# Patient Record
Sex: MALE | Race: WHITE | NOT HISPANIC OR LATINO | Employment: OTHER | URBAN - METROPOLITAN AREA
[De-identification: names, ages, dates, MRNs, and addresses within clinical notes are randomized per-mention and may not be internally consistent; named-entity substitution may affect disease eponyms.]

---

## 2017-05-15 ENCOUNTER — CLINICAL SUPPORT (OUTPATIENT)
Dept: SMOKING CESSATION | Facility: CLINIC | Age: 62
End: 2017-05-15
Payer: COMMERCIAL

## 2017-05-15 DIAGNOSIS — F17.200 TOBACCO USE DISORDER: Primary | ICD-10-CM

## 2017-05-15 PROCEDURE — 99404 PREV MED CNSL INDIV APPRX 60: CPT | Mod: S$GLB,,,

## 2017-05-15 PROCEDURE — 99999 PR PBB SHADOW E&M-EST. PATIENT-LVL I: CPT | Mod: PBBFAC,,,

## 2017-05-15 RX ORDER — IBUPROFEN 200 MG
1 TABLET ORAL DAILY
Qty: 28 PATCH | Refills: 0 | COMMUNITY
Start: 2017-05-15 | End: 2017-05-15 | Stop reason: CLARIF

## 2017-05-15 RX ORDER — GABAPENTIN 250 MG/5ML
SOLUTION ORAL 3 TIMES DAILY
COMMUNITY
End: 2024-02-27

## 2017-05-15 RX ORDER — IBUPROFEN 200 MG
1 TABLET ORAL DAILY
Qty: 14 PATCH | Refills: 0 | COMMUNITY
Start: 2017-05-15 | End: 2017-05-16 | Stop reason: SDUPTHER

## 2017-05-16 ENCOUNTER — CLINICAL SUPPORT (OUTPATIENT)
Dept: SMOKING CESSATION | Facility: CLINIC | Age: 62
End: 2017-05-16
Payer: COMMERCIAL

## 2017-05-16 DIAGNOSIS — F17.200 TOBACCO USE DISORDER: Primary | ICD-10-CM

## 2017-05-16 PROCEDURE — 99407 BEHAV CHNG SMOKING > 10 MIN: CPT | Mod: S$GLB,,,

## 2017-05-16 RX ORDER — IBUPROFEN 200 MG
1 TABLET ORAL DAILY
Qty: 14 PATCH | Refills: 0 | Status: SHIPPED | OUTPATIENT
Start: 2017-05-16 | End: 2017-05-29 | Stop reason: SDUPTHER

## 2017-05-23 ENCOUNTER — CLINICAL SUPPORT (OUTPATIENT)
Dept: SMOKING CESSATION | Facility: CLINIC | Age: 62
End: 2017-05-23
Payer: COMMERCIAL

## 2017-05-23 DIAGNOSIS — F17.200 TOBACCO USE DISORDER: Primary | ICD-10-CM

## 2017-05-23 PROCEDURE — 99407 BEHAV CHNG SMOKING > 10 MIN: CPT | Mod: S$GLB,,,

## 2017-05-29 ENCOUNTER — CLINICAL SUPPORT (OUTPATIENT)
Dept: SMOKING CESSATION | Facility: CLINIC | Age: 62
End: 2017-05-29
Payer: COMMERCIAL

## 2017-05-29 DIAGNOSIS — F17.200 TOBACCO USE DISORDER: ICD-10-CM

## 2017-05-29 PROCEDURE — 99403 PREV MED CNSL INDIV APPRX 45: CPT | Mod: S$GLB,,,

## 2017-05-29 PROCEDURE — 99999 PR PBB SHADOW E&M-EST. PATIENT-LVL I: CPT | Mod: PBBFAC,,,

## 2017-05-29 RX ORDER — IBUPROFEN 200 MG
1 TABLET ORAL DAILY
Qty: 14 PATCH | Refills: 0 | Status: SHIPPED | OUTPATIENT
Start: 2017-05-29 | End: 2017-06-12 | Stop reason: DRUGHIGH

## 2017-05-29 NOTE — PROGRESS NOTES
Individual Follow-Up Form    5/29/2017    Quit Date: 5/165/17    Clinical Status of Patient: Outpatient    Length of Service: 45 minutes    Continuing Medication: yes  Patches    Other Medications:      Target Symptoms: Withdrawal and medication side effects. The following were  rated moderate (3) to severe (4) on TCRS:  · Moderate (3): none  · Severe (4): none    Comments: Pt seen in office today. Patient is tobacco free since 5/16/17 . Pt remains on tobacco cessation medication of 21 mg nicotine patch QD  No adverse effects noted at this time. Reviewed coping strategies/habitual behavior/relapse prevention with patient. Exhaled carbon monoxide level was 0 ppm per Smokerlyzer (non- smoker = 0-5 ppm.) Will see pt back in office in 2 weeks.        Diagnosis: F17.200    Next Visit: 2 weeks

## 2017-05-29 NOTE — Clinical Note
Pt seen in office today. Patient is tobacco free since 5/16/17 . Pt remains on tobacco cessation medication of 21 mg nicotine patch QD  No adverse effects noted at this time. Reviewed coping strategies/habitual behavior/relapse prevention with patient. Exhaled carbon monoxide level was 0 ppm per Smokerlyzer (non- smoker = 0-5 ppm.) Will see pt back in office in 2 weeks. Gave patient his quit coin.

## 2017-06-06 ENCOUNTER — CLINICAL SUPPORT (OUTPATIENT)
Dept: SMOKING CESSATION | Facility: CLINIC | Age: 62
End: 2017-06-06
Payer: COMMERCIAL

## 2017-06-06 DIAGNOSIS — F17.200 TOBACCO USE DISORDER: Primary | ICD-10-CM

## 2017-06-06 PROCEDURE — 99407 BEHAV CHNG SMOKING > 10 MIN: CPT | Mod: S$GLB,,,

## 2017-06-12 ENCOUNTER — CLINICAL SUPPORT (OUTPATIENT)
Dept: SMOKING CESSATION | Facility: CLINIC | Age: 62
End: 2017-06-12
Payer: COMMERCIAL

## 2017-06-12 DIAGNOSIS — F17.200 TOBACCO USE DISORDER: Primary | ICD-10-CM

## 2017-06-12 PROCEDURE — 99407 BEHAV CHNG SMOKING > 10 MIN: CPT | Mod: S$GLB,,,

## 2017-06-12 RX ORDER — IBUPROFEN 200 MG
1 TABLET ORAL DAILY
Qty: 14 PATCH | Refills: 0 | Status: SHIPPED | OUTPATIENT
Start: 2017-06-12 | End: 2017-06-26 | Stop reason: SDUPTHER

## 2017-06-26 ENCOUNTER — CLINICAL SUPPORT (OUTPATIENT)
Dept: SMOKING CESSATION | Facility: CLINIC | Age: 62
End: 2017-06-26
Payer: COMMERCIAL

## 2017-06-26 DIAGNOSIS — F17.200 TOBACCO USE DISORDER: Primary | ICD-10-CM

## 2017-06-26 PROCEDURE — 99999 PR PBB SHADOW E&M-EST. PATIENT-LVL I: CPT | Mod: PBBFAC,,,

## 2017-06-26 PROCEDURE — 99403 PREV MED CNSL INDIV APPRX 45: CPT | Mod: S$GLB,,,

## 2017-06-26 RX ORDER — IBUPROFEN 200 MG
1 TABLET ORAL DAILY
Qty: 14 PATCH | Refills: 0 | Status: SHIPPED | OUTPATIENT
Start: 2017-06-26 | End: 2017-07-10 | Stop reason: SDUPTHER

## 2017-06-26 NOTE — PROGRESS NOTES
Individual Follow-Up Form    6/26/2017    Quit Date: 5/16/17    Clinical Status of Patient: Outpatient    Length of Service: 45 minutes    Continuing Medication: yes  Patches    Other Medications:      Target Symptoms: Withdrawal and medication side effects. The following were  rated moderate (3) to severe (4) on TCRS:  · Moderate (3): none  · Severe (4): none    Comments: Pt seen in office today. Patient is tobacco free since 5/16/17. Pt remains on tobacco cessation medication of  14 mg nicotine patch QD  No adverse effects/mental changes noted at this time. Reviewed coping strategies/habitual behavior/relapse prevention with patient. Exhaled carbon monoxide level was 0 ppm per Smokerlyzer (non- smoker = 0-5 ppm.) Will see pt back in office in 2 weeks.        Diagnosis: F17.200    Next Visit: 2 weeks

## 2017-07-10 ENCOUNTER — CLINICAL SUPPORT (OUTPATIENT)
Dept: SMOKING CESSATION | Facility: CLINIC | Age: 62
End: 2017-07-10
Payer: COMMERCIAL

## 2017-07-10 DIAGNOSIS — F17.200 TOBACCO USE DISORDER: Primary | ICD-10-CM

## 2017-07-10 PROCEDURE — 99402 PREV MED CNSL INDIV APPRX 30: CPT | Mod: S$GLB,,,

## 2017-07-10 RX ORDER — IBUPROFEN 200 MG
1 TABLET ORAL DAILY
Qty: 14 PATCH | Refills: 0 | Status: SHIPPED | OUTPATIENT
Start: 2017-07-10 | End: 2017-07-24 | Stop reason: DRUGHIGH

## 2017-07-10 NOTE — PROGRESS NOTES
Individual Follow-Up Form    7/10/2017    Quit Date: 5/16/17    Clinical Status of Patient: Outpatient    Length of Service: 30 minutes    Continuing Medication: yes  Patches    Other Medications: none     Target Symptoms: Withdrawal and medication side effects. The following were  rated moderate (3) to severe (4) on TCRS:  · Moderate (3): none  · Severe (4): none    Comments:  Patient is tobacco free. Pt remains on tobacco cessation medication of  14 mg nicotine patch QD and  No adverse effects  noted at this time. Reviewed coping strategies/habitual behavior/relapse prevention with patient. Exhaled carbon monoxide level was 0 ppm per Smokerlyzer (non- smoker = 0-5 ppm.) Will see pt back in office in 2 weeks.        Diagnosis: F17.200    Next Visit: 2 weeks

## 2017-07-24 ENCOUNTER — CLINICAL SUPPORT (OUTPATIENT)
Dept: SMOKING CESSATION | Facility: CLINIC | Age: 62
End: 2017-07-24
Payer: COMMERCIAL

## 2017-07-24 DIAGNOSIS — F17.200 TOBACCO USE DISORDER: Primary | ICD-10-CM

## 2017-07-24 PROCEDURE — 99403 PREV MED CNSL INDIV APPRX 45: CPT | Mod: S$GLB,,,

## 2017-07-24 RX ORDER — NICOTINE 7MG/24HR
1 PATCH, TRANSDERMAL 24 HOURS TRANSDERMAL DAILY
Qty: 14 PATCH | Refills: 0 | Status: SHIPPED | OUTPATIENT
Start: 2017-07-24 | End: 2017-08-07 | Stop reason: SDUPTHER

## 2017-07-25 NOTE — PROGRESS NOTES
Individual Follow-Up Form    7/24/17    Quit Date: 5/16/2017    Clinical Status of Patient: Outpatient    Length of Service: 30 minutes    Continuing Medication: yes  Patches    Other Medications:      Target Symptoms: Withdrawal and medication side effects. The following were  rated moderate (3) to severe (4) on TCRS:  · Moderate (3): none  · Severe (4): none    Comments: Pt seen in office today. Patient is tobacco free since 5/16/2017 . Pt remains on tobacco cessation medication of  7 mg nicotine patch QD  No adverse effects noted at this time. Reviewed coping strategies/habitual behavior/relapse prevention with patient. Exhaled carbon monoxide level was 0 ppm per Smokerlyzer (non- smoker = 0-5 ppm.) Will see pt back in office in 2 weeks.        Diagnosis: F17.200    Next Visit: 2 weeks

## 2017-08-07 ENCOUNTER — CLINICAL SUPPORT (OUTPATIENT)
Dept: SMOKING CESSATION | Facility: CLINIC | Age: 62
End: 2017-08-07
Payer: COMMERCIAL

## 2017-08-07 DIAGNOSIS — F17.200 TOBACCO USE DISORDER: ICD-10-CM

## 2017-08-07 PROCEDURE — 99402 PREV MED CNSL INDIV APPRX 30: CPT | Mod: S$GLB,,,

## 2017-08-07 PROCEDURE — 99999 PR PBB SHADOW E&M-EST. PATIENT-LVL I: CPT | Mod: PBBFAC,,,

## 2017-08-07 RX ORDER — NICOTINE 7MG/24HR
1 PATCH, TRANSDERMAL 24 HOURS TRANSDERMAL DAILY
Qty: 14 PATCH | Refills: 0 | Status: SHIPPED | OUTPATIENT
Start: 2017-08-07 | End: 2017-08-21

## 2017-08-07 NOTE — PROGRESS NOTES
Individual Follow-Up Form    8/7/2017    Quit Date: 5/16/17    Clinical Status of Patient: Outpatient    Length of Service: 30 minutes    Continuing Medication: yes  Patches    Other Medications:      Target Symptoms: Withdrawal and medication side effects. The following were  rated moderate (3) to severe (4) on TCRS:  · Moderate (3): none  · Severe (4): none    Comments: Patient is tobacco free since 5/16/17. Pt remains on tobacco cessation medication of  7 mg nicotine patch QD   Again explained to patient that he is ready to go without NRT, this his last two weeks on NRT.  No adverse effects/mental changes noted at this time. Reviewed coping strategies/habitual behavior/relapse prevention with patient. Exhaled carbon monoxide level was 0 ppm per Smokerlyzer (non- smoker = 0-5 ppm.)    Diagnosis: F17.200    Next Visit: 2 weeks

## 2018-04-25 ENCOUNTER — TELEPHONE (OUTPATIENT)
Dept: SMOKING CESSATION | Facility: CLINIC | Age: 63
End: 2018-04-25

## 2018-04-26 ENCOUNTER — TELEPHONE (OUTPATIENT)
Dept: SMOKING CESSATION | Facility: CLINIC | Age: 63
End: 2018-04-26

## 2018-05-01 ENCOUNTER — CLINICAL SUPPORT (OUTPATIENT)
Dept: SMOKING CESSATION | Facility: CLINIC | Age: 63
End: 2018-05-01
Payer: COMMERCIAL

## 2018-05-01 DIAGNOSIS — F17.200 NICOTINE DEPENDENCE: Primary | ICD-10-CM

## 2018-05-01 PROCEDURE — 99407 BEHAV CHNG SMOKING > 10 MIN: CPT | Mod: S$GLB,,,

## 2018-09-06 DIAGNOSIS — M79.644 PAIN IN RIGHT FINGER(S): Primary | ICD-10-CM

## 2018-09-12 ENCOUNTER — CLINICAL SUPPORT (OUTPATIENT)
Dept: REHABILITATION | Facility: HOSPITAL | Age: 63
End: 2018-09-12
Payer: MEDICAID

## 2018-09-12 DIAGNOSIS — M79.646 PAIN OF FINGER, UNSPECIFIED LATERALITY: ICD-10-CM

## 2018-09-12 PROCEDURE — 97165 OT EVAL LOW COMPLEX 30 MIN: CPT | Mod: PN

## 2018-09-12 NOTE — PROGRESS NOTES
"  Occupational Therapy Evaluation - Hand, Wrist, and/or Elbow Condition     Date: 2018  Name: Prasanna cSott  YOB: 1955  Chart Number: [id number]  Referring Physician: Bambi Yang*  Diagnosis:   1. Pain of finger, unspecified laterality       ICD 10: M79.644 (ICD-10-CM) - Pain in right finger  Involved Side: right thumb  Hand Dominance: right  Date of Onset: 6 months ago  Date of Injury/Surgery: none  Surgical Procedure: none  Mechanism of Injury: Insidious  Additional Info: Pt referred to therapy for thumb pain  Date of Return to MD:   Past Medical History/Comorbidities:   Past Medical History:   Diagnosis Date    Arthritis        Prior Functional Status: Full  Occupation: retired   Employer: disability  Home Environment: Pt lives with girlfriend   Leisure/Social Activities: None  Cultural/Spiritual: no barrier  Barriers to Learning: none   Hearing/Vision Loss: none    Visit #: . Visits  on 18.    Subjective   "The last therapist told me to do this" Pt aggressively gripping the thumb and radially deviating the thumb  Pain Report (severity and location)  Current: 6 out of 10  With activity: 6 out of 10   Report of Functional Deficits/Chief Complaints: Carrying a glass of water     Objective   Observation: Pt has swelling in first CMC joint    Edema. Measured in centimeters.  Date 2018      Left Right     CMC  10.5 10.5       Wrist ROM. Measured in degrees.  Date 2018    right   Supination/Pronation WNL   Wrist ext/flex WNL   Wrist RD/UD W NL       Hand ROM. Measured in degrees.  Date 2018    Left       Thumb: MP 65                IP 70       Rad ADD/ABD 45       Pal ADD/ABD 45          Opposition         Strength (Dyanmometer) and Pinch Strength (Pinch Gauge)  Measured in pounds. Not assessed 2' acuity     Date 2018    Left   Rung II TBD   Key Pinch TBD   3pt Pinch TBD   2pt Pinch TBD       Sensation: Patient denies paresthesias "   Manual Muscle Test  Date 9/12/2018    Right   Wrist Extension  4+/5   Wrist Flexion 5/5   Radial Deviation 4+/5   Ulnar Deviation 5/5   Supination 5/5   Pronation 5/5   EPL 4/5   FLP 4/5   OP 4/5   APL 4/5         Special Tests  Thumb CMC Grind Test Positive right       Functional Limitation Reporting   Tool: FOTO  Category: self care  Visit DATE SCORE Current  G-Code Goal  G-Code   Intake 9/12/2018 62/100 CK Cl 32/100   5TH       10TH       Discharge            Treatment   Patient received paraffin bath to right hand(s) for 8 minutes to increase blood flow, circulation, pain management and for tissue elasticity prior to therex.   Patient received ultrasound to first CMC joint area to increase blood flow, circulation, tissue elasticity, pain management and for wound/scar management for 8 minutes @ 3.3 Mhz, Intensity .08 w/cm2 at 100% duty cycle.    Joint protective strategies discussed in depth  KT tape to improve CMC ligament     Charges   Start Time: 4:00 pm  End Time: 4:45 pm  Total Time: 45  Initial eval-04545 Low    Fluidotherapy-81856    Paraffin-15304    Moist Hot Pack-81371    Ultrasound-25819    Therapeutic Exercise-01429    Therapeutic Activity-95374    Manual Therapy-38558        Assessment   Patient is a 63 y.o. year old male with a diagnosis of Finger pain. Pt arrives to therapy with symptoms consistent with CMC joint OA. The patient has a collapsed CMC joint with hyperextension at the MP. No paresthesias. The patient has limited use of his left hand and relies heavily on his right UE. Limitations affecting independence with the patient's normal, daily routine include grabbing objects, opening jars and driving.       Profile and History Assessment of Occupational Performance Level of Clinical Decision Making Complexity Score   Occupational Profile:   Prasanna Scott is a 63 y.o. male who lives with their partner and is currently disability.     Prasanna Scott has difficulty with  feeding and  dressing  driving/transportation management and housework/household chores  affecting his/her daily functional abilities. His/her main goal for therapy is to keep this thumb healthy.     Comorbidities:   pt has significant medical history    Medical and Therapy History Review:   Brief               Performance Deficits    Physical:  Joint Mobility  Joint Stability  Edema   Strength  Pinch Strength  Gross Motor Coordination  Fine Motor Coordination  Pain    Cognitive:  No Deficits    Psychosocial:    Routines     Clinical Decision Making:  Low    Assessment Process:  Problem-Focused Assessments    Body Structures:  Related to movement    Body Functions:  Musculoskeletal Functions  Movement Functions  Skin Functions  Sensory Functions  Neuromuscular Functions  Cardiovascular Functions  Mental Functions  Voice/Speech Functions    Modification/Need for Assistance:  none    Intervention Selection:   Multiple Treatment Options       low  Based on PMHX, co morbidities , data from assessments and functional level of assistance required with task and clinical presentation directly impacting function.       Goals       Goals to be met in 4 weeks: (9/12/18)  1) Initiate Hep   2) Pt to increase AROM of thumb with ext/flx by 5 degrees by 4 weeks.  3) Pt to increase / pinch strength by 5 pounds by 4 weeks.  4) Pt to decrease pain to less than or equal to 3/10 by 4 weeks.   5) Patient will be able to achieve less than or equal to 30% on the FOTO, demonstrating overall improved functional ability with upper extremity.      Goals to be met by discharge:  1) Independent with HEP  2) Pt to increase AROM of thumb to WNL as compared to R wrist by d/c.   3) Pt to increase strength by 10 pounds or WNL as compared to RUE by d/c.   4) Pt to decrease pain to trace or none by d/c.   5) Patient will be able to achieve less than or equal to 20% on the FOTO, demonstrating overall improved functional ability with upper extremity.        Plan     Initiate skilled occupational therapy services 1x/week for 6-8 weeks from 9/12/2018 to 11/12/18.    Treatment interventions to include:  Heat modalities (29315 or 50966 or 94853)  Cold modalities (96542)  Pain management modalities (82564)  Scar management (47219 or 78764)  Edema control techniques (82833)  Manual therapy (96123)  Splinting (pending L code or 69628 or 88410)  Therapeutic exercises (04640)  Therapeutic activities (67147)  ADL training (60132 or 21257 or 82899)  Work simulation/Work conditioning (07350 or 19003)  Astym and/or IASTM (51033)  Strengthening and Endurance training (47524 or 96639 or 05846 or 68739)  Kinesiotaping (45542)  HEP instruction (12494)   NMES 17297 or (68203)  Patient/Caregiver instruction (93825)    Therapist: LEXI Melara, OTR/L     I certify the need for these services furnished under this plan of treatment and while under my care    ____________________________________                         __________________  Physician/Referring Practitioner                                               Date of Signature

## 2018-09-12 NOTE — PLAN OF CARE
"  Occupational Therapy Evaluation - Hand, Wrist, and/or Elbow Condition     Date: 2018  Name: Prasanna Scott  YOB: 1955  Chart Number: [id number]  Referring Physician: Bambi Yang*  Diagnosis:   1. Pain of finger, unspecified laterality       ICD 10: M79.644 (ICD-10-CM) - Pain in right finger  Involved Side: right thumb  Hand Dominance: right  Date of Onset: 6 months ago  Date of Injury/Surgery: none  Surgical Procedure: none  Mechanism of Injury: Insidious  Additional Info: Pt referred to therapy for thumb pain  Date of Return to MD:   Past Medical History/Comorbidities:   Past Medical History:   Diagnosis Date    Arthritis        Prior Functional Status: Full  Occupation: retired   Employer: disability  Home Environment: Pt lives with girlfriend   Leisure/Social Activities: None  Cultural/Spiritual: no barrier  Barriers to Learning: none   Hearing/Vision Loss: none    Visit #: . Visits  on 18.    Subjective   "The last therapist told me to do this" Pt aggressively gripping the thumb and radially deviating the thumb  Pain Report (severity and location)  Current: 6 out of 10  With activity: 6 out of 10   Report of Functional Deficits/Chief Complaints: Carrying a glass of water     Objective   Observation: Pt has swelling in first CMC joint    Edema. Measured in centimeters.  Date 2018      Left Right     CMC  10.5 10.5       Wrist ROM. Measured in degrees.  Date 2018    right   Supination/Pronation WNL   Wrist ext/flex WNL   Wrist RD/UD W NL       Hand ROM. Measured in degrees.  Date 2018    Left       Thumb: MP 65                IP 70       Rad ADD/ABD 45       Pal ADD/ABD 45          Opposition         Strength (Dyanmometer) and Pinch Strength (Pinch Gauge)  Measured in pounds. Not assessed 2' acuity     Date 2018    Left   Rung II TBD   Key Pinch TBD   3pt Pinch TBD   2pt Pinch TBD       Sensation: Patient denies paresthesias "   Manual Muscle Test  Date 9/12/2018    Right   Wrist Extension  4+/5   Wrist Flexion 5/5   Radial Deviation 4+/5   Ulnar Deviation 5/5   Supination 5/5   Pronation 5/5   EPL 4/5   FLP 4/5   OP 4/5   APL 4/5         Special Tests  Thumb CMC Grind Test Positive right       Functional Limitation Reporting   Tool: FOTO  Category: self care  Visit DATE SCORE Current  G-Code Goal  G-Code   Intake 9/12/2018 62/100 CK Cl 32/100   5TH       10TH       Discharge            Treatment   Patient received paraffin bath to right hand(s) for 8 minutes to increase blood flow, circulation, pain management and for tissue elasticity prior to therex.   Patient received ultrasound to first CMC joint area to increase blood flow, circulation, tissue elasticity, pain management and for wound/scar management for 8 minutes @ 3.3 Mhz, Intensity .08 w/cm2 at 100% duty cycle.    Joint protective strategies discussed in depth  KT tape to improve CMC ligament     Charges   Start Time: 4:00 pm  End Time: 4:45 pm  Total Time: 45  Initial eval-67904 Low    Fluidotherapy-81595    Paraffin-16602    Moist Hot Pack-02985    Ultrasound-16945    Therapeutic Exercise-60259    Therapeutic Activity-58295    Manual Therapy-91142        Assessment   Patient is a 63 y.o. year old male with a diagnosis of Finger pain. Pt arrives to therapy with symptoms consistent with CMC joint OA. The patient has a collapsed CMC joint with hyperextension at the MP. No paresthesias. The patient has limited use of his left hand and relies heavily on his right UE. Limitations affecting independence with the patient's normal, daily routine include grabbing objects, opening jars and driving.       Profile and History Assessment of Occupational Performance Level of Clinical Decision Making Complexity Score   Occupational Profile:   Prasanna Scott is a 63 y.o. male who lives with their partner and is currently disability.     Prasanna Scott has difficulty with  feeding and  dressing  driving/transportation management and housework/household chores  affecting his/her daily functional abilities. His/her main goal for therapy is to keep this thumb healthy.     Comorbidities:   pt has significant medical history    Medical and Therapy History Review:   Brief               Performance Deficits    Physical:  Joint Mobility  Joint Stability  Edema   Strength  Pinch Strength  Gross Motor Coordination  Fine Motor Coordination  Pain    Cognitive:  No Deficits    Psychosocial:    Routines     Clinical Decision Making:  Low    Assessment Process:  Problem-Focused Assessments    Body Structures:  Related to movement    Body Functions:  Musculoskeletal Functions  Movement Functions  Skin Functions  Sensory Functions  Neuromuscular Functions  Cardiovascular Functions  Mental Functions  Voice/Speech Functions    Modification/Need for Assistance:  none    Intervention Selection:   Multiple Treatment Options       low  Based on PMHX, co morbidities , data from assessments and functional level of assistance required with task and clinical presentation directly impacting function.       Goals       Goals to be met in 4 weeks: (9/12/18)  1) Initiate Hep   2) Pt to increase AROM of thumb with ext/flx by 5 degrees by 4 weeks.  3) Pt to increase / pinch strength by 5 pounds by 4 weeks.  4) Pt to decrease pain to less than or equal to 3/10 by 4 weeks.   5) Patient will be able to achieve less than or equal to 30% on the FOTO, demonstrating overall improved functional ability with upper extremity.      Goals to be met by discharge:  1) Independent with HEP  2) Pt to increase AROM of thumb to WNL as compared to R wrist by d/c.   3) Pt to increase strength by 10 pounds or WNL as compared to RUE by d/c.   4) Pt to decrease pain to trace or none by d/c.   5) Patient will be able to achieve less than or equal to 20% on the FOTO, demonstrating overall improved functional ability with upper extremity.        Plan     Initiate skilled occupational therapy services 1x/week for 6-8 weeks from 9/12/2018 to 11/12/18.    Treatment interventions to include:  Heat modalities (12591 or 03391 or 76242)  Cold modalities (77762)  Pain management modalities (75453)  Scar management (12623 or 09264)  Edema control techniques (78630)  Manual therapy (11726)  Splinting (pending L code or 67033 or 06008)  Therapeutic exercises (38385)  Therapeutic activities (33456)  ADL training (02408 or 97194 or 04558)  Work simulation/Work conditioning (08302 or 33512)  Astym and/or IASTM (91849)  Strengthening and Endurance training (71858 or 45469 or 83667 or 60913)  Kinesiotaping (99607)  HEP instruction (61146)   NMES 90733 or (32446)  Patient/Caregiver instruction (33722)    Therapist: LEXI Melara, OTR/L     I certify the need for these services furnished under this plan of treatment and while under my care    ____________________________________                         __________________  Physician/Referring Practitioner                                               Date of Signature

## 2018-09-21 ENCOUNTER — CLINICAL SUPPORT (OUTPATIENT)
Dept: REHABILITATION | Facility: HOSPITAL | Age: 63
End: 2018-09-21
Payer: MEDICAID

## 2018-09-21 PROCEDURE — 97150 GROUP THERAPEUTIC PROCEDURES: CPT | Mod: PN

## 2018-09-21 PROCEDURE — 97530 THERAPEUTIC ACTIVITIES: CPT | Mod: PN

## 2018-09-21 NOTE — PROGRESS NOTES
"  Occupational Therapy Daily Note - Hand, Wrist, and/or Elbow Condition     Date: 2018  Name: Prasanna Scott  YOB: 1955  Chart Number: [id number]  Referring Physician: Bambi Yang*  ICD 10: M79.644 (ICD-10-CM) - Pain in right finger  Involved Side: right thumb  Hand Dominance: right  Date of Onset: 6 months ago  Date of Injury/Surgery: none  Surgical Procedure: none  Mechanism of Injury: Insidious  Additional Info: Pt referred to therapy for thumb pain  Date of Return to MD:   Past Medical History/Comorbidities:   Past Medical History:   Diagnosis Date    Arthritis        Prior Functional Status: Full  Occupation: retired   Employer: disability  Home Environment: Pt lives with girlfriend   Leisure/Social Activities: None  Cultural/Spiritual: no barrier  Barriers to Learning: none   Hearing/Vision Loss: none    Visit #:  20. Visits  on 18.    Subjective   "Its still hurting quite a bit"   Pain Report (severity and location)  Current: 6 out of 10  With activity: 6 out of 10   Report of Functional Deficits/Chief Complaints: Carrying a glass of water       Functional Limitation Reporting   Tool: FOTO  Category: self care  Visit DATE SCORE Current  G-Code Goal  G-Code   Intake 2018 62/100 CK Cl 32/100   5TH       10TH       Discharge            Treatment/objective      Patient received paraffin bath to right hand(s) for 8 minutes to increase blood flow, circulation, pain management and for tissue elasticity prior to therex.   Patient received ultrasound to first CMC joint area to increase blood flow, circulation, tissue elasticity, pain management and for wound/scar management for 8 minutes @ 3.3 Mhz, Intensity .08 w/cm2 at 100% duty cycle.  Gentle thenar mobilization   Carpal mobilization     Joint protective strategies discussed in depth  KT tape to improve CMC ligament   Patient received fluidotherapy to right  hand(s) for 10 minutes to increase blood flow, " circulation, desensitization, sensory re-education and for pain management.     Charges   Start Time: 10:30 am   End Time: 11:10 am  Total Time: 40  Group 15 min    Initial eval-60604    Fluidotherapy-99046    Paraffin-97018 x8   Moist Hot Pack-91005    Ultrasound-97035 x8   Therapeutic Exercise-97110 x14   Therapeutic Activity-99973    Manual Therapy-97140 x10       Assessment   Patient is a 63 y.o. year old male with a diagnosis of Finger pain. Pt arrives to therapy with symptoms consistent with CMC joint OA. Pt further presents with tenderness along the first dorsal compartment. Therapy activities well tolerated. Pt provided with educational handouts and appears to have good carryover. Limitations affecting independence with the patient's normal, daily routine include grabbing objects, opening jars and driving.         Plan     Cont. skilled occupational therapy services 1x/week for 6-8 weeks from 9/21/2018 to 11/12/18.      Therapist: LEXI Melara, OTR/L     I certify the need for these services furnished under this plan of treatment and while under my care

## 2018-10-02 ENCOUNTER — CLINICAL SUPPORT (OUTPATIENT)
Dept: REHABILITATION | Facility: HOSPITAL | Age: 63
End: 2018-10-02
Payer: MEDICAID

## 2018-10-02 PROCEDURE — 97530 THERAPEUTIC ACTIVITIES: CPT | Mod: PN

## 2018-10-02 NOTE — PROGRESS NOTES
"  Occupational Therapy Daily Note - Hand, Wrist, and/or Elbow Condition     Date: 10/2/2018  Name: Prasanna Scott  YOB: 1955  Chart Number: [id number]  Referring Physician: aBmbi Yang*  ICD 10: M79.644 (ICD-10-CM) - Pain in right finger  Involved Side: right thumb  Hand Dominance: right  Date of Onset: 6 months ago  Date of Injury/Surgery: none  Surgical Procedure: none  Mechanism of Injury: Insidious  Additional Info: Pt referred to therapy for thumb pain  Date of Return to MD:   Past Medical History/Comorbidities:   Past Medical History:   Diagnosis Date    Arthritis        Prior Functional Status: Full  Occupation: retired   Employer: disability  Home Environment: Pt lives with girlfriend   Leisure/Social Activities: None  Cultural/Spiritual: no barrier  Barriers to Learning: none   Hearing/Vision Loss: none    Visit #: 3 of 20. Visits  on 18.    Subjective   "I got the wrist brace and its feeling so much better!"   Pain Report (severity and location)  Current: 3 out of 10  With activity: 4  out of 10   Report of Functional Deficits/Chief Complaints: Carrying a glass of water       Functional Limitation Reporting   Tool: FOTO  Category: self care  Visit DATE SCORE Current  G-Code Goal  G-Code   Intake 10/2/2018 62/100 CK Cl 32/100   5TH       10TH       Discharge            Treatment/objective      Patient received paraffin bath to right hand(s) for 8 minutes to increase blood flow, circulation, pain management and for tissue elasticity prior to therex.   Patient received ultrasound to first CMC joint area to increase blood flow, circulation, tissue elasticity, pain management and for wound/scar management for 8 minutes @ 3.3 Mhz, Intensity .08 w/cm2 at 100% duty cycle.  Gentle thenar mobilization   Carpal mobilization     Theraputty: soft yellow: gross grasp 2 min, flatten into omero and use highligher to dimple x 2 min, roll into snake chop using scrapping tool. X 2 min. " Roll into snake and pinch 3 pt/2 pt x 2 min, intrinsic scissoring finger/abd/add x 2min  Wrist extension and flexion and radial deviation x 20 each 2 lbs   Ice following therex     KT tape to improve CMC ligament   Pt provided with yellow t-putty for HEP to perform HEP below          Charges   Start Time: 10:35 am   End Time: 12:20 am  Total Time: 45      Initial eval-17662    Fluidotherapy-69726    Paraffin-97018 x8   Moist Hot Pack-85612    Ultrasound-97035 x8   Therapeutic Exercise-97110 x14   Therapeutic Activity-46442    Manual Therapy-97140 x10       Assessment   Patient is a 63 y.o. year old male with a diagnosis of Finger pain. Pt arrives to therapy with symptoms consistent with CMC joint OA. Pt further presents with tenderness along the first dorsal compartment. Pt arrives to therapy with thumb CMC brace he purchased independently. Brace was reportedly very helpful for pain. Gentle theraputy HEP and therex instructed pt demonstrated improved AROM overall + decreased pain. Limitations affecting independence with the patient's normal, daily routine include grabbing objects, opening jars and driving.         Plan     Cont. skilled occupational therapy services 1x/week for 6-8 weeks from 10/2/2018 to 11/12/18.      Therapist: LEXI Melara, OTR/L     I certify the need for these services furnished under this plan of treatment and while under my care

## 2018-10-08 ENCOUNTER — CLINICAL SUPPORT (OUTPATIENT)
Dept: REHABILITATION | Facility: HOSPITAL | Age: 63
End: 2018-10-08
Payer: MEDICAID

## 2018-10-08 PROCEDURE — 97530 THERAPEUTIC ACTIVITIES: CPT | Mod: PN

## 2018-10-08 NOTE — PROGRESS NOTES
"  Occupational Therapy Daily Note - Hand, Wrist, and/or Elbow Condition     Date: 10/8/2018  Name: Prasanna Scott  YOB: 1955  Chart Number: [id number]  Referring Physician: Bambi Yang*  ICD 10: M79.644 (ICD-10-CM) - Pain in right finger  Involved Side: right thumb  Hand Dominance: right  Date of Onset: 6 months ago  Date of Injury/Surgery: none  Surgical Procedure: none  Mechanism of Injury: Insidious  Additional Info: Pt referred to therapy for thumb pain  Date of Return to MD:   Prior Functional Status: Full  Occupation: retired   Employer: disability  Home Environment: Pt lives with girlfriend   Leisure/Social Activities: None  Cultural/Spiritual: no barrier  Barriers to Learning: none   Hearing/Vision Loss: none    Visit #: . Visits  on 18.    Subjective   "I still feel like its doing better. Its been a week since it hurt "   Pain Report (severity and loca tion)  Current: 2 out of 10  With activity: 3  out of 10   Report of Functional Deficits/Chief Complaints: Carrying a glass of water       Functional Limitation Reporting   Tool: FOTO  Category: self care  Visit DATE SCORE Current  G-Code Goal  G-Code   Intake 10/8/2018 62/100 CK Cl 32/100   5TH       10TH       Discharge            Treatment/objective      Patient received paraffin bath to right hand(s) for 8 minutes to increase blood flow, circulation, pain management and for tissue elasticity prior to therex.   Patient received ultrasound to first CMC joint area to increase blood flow, circulation, tissue elasticity, pain management and for wound/scar management for 8 minutes @ 3.3 Mhz, Intensity .08 w/cm2 at 100% duty cycle.  Gentle thenar mobilization   Carpal mobilization     Theraputty: soft yellow: gross grasp 2 min, flatten into omero and use highligher to dimple x 2 min, roll into snake chop using scrapping tool. X 2 min. Roll into snake and pinch 3 pt/2 pt x 2 min, intrinsic scissoring " finger/abd/add x 2min  Wrist extension and flexion and radial deviation x 20 each 2 lbs   Wrist roller x 3 lbs x 5 reps   Ice following therex     KT tape to improve CMC ligament   Pt provided with yellow t-putty for HEP to perform HEP below        Charges   Start Time: 1:00 pm   End Time: 2:00 pm  Total Time: 55      Initial eval-63102    Fluidotherapy-10061    Paraffin-97018 x8   Moist Hot Pack-58621    Ultrasound-97035 x8   Therapeutic Exercise-97110 x14   Therapeutic Activity-84707    Manual Therapy-97140 x10       Assessment   Patient is a 63 y.o. year old male with a diagnosis of Finger pain. Pt arrives to therapy with symptoms consistent with CMC joint OA. Continued improvement with opposition and improved body mechanics. Pt was observed avoiding hyperextending cmc joint and demonstrated improved opening of web space. Gentle theraputy HEP and therex instructed pt demonstrated improved AROM overall + decreased pain. Limitations affecting independence with the patient's normal, daily routine include grabbing objects, opening jars and driving.         Plan     Cont. skilled occupational therapy services 1x/week for 6-8 weeks from 10/8/2018 to 11/12/18.      Therapist: LEXI Melara, OTR/L     I certify the need for these services furnished under this plan of treatment and while under my care

## 2018-10-08 NOTE — PROGRESS NOTES
"  Occupational Therapy Evaluation - Hand, Wrist, and/or Elbow Condition     Date: 10/8/2018  Name: Prasanna Scott  YOB: 1955  Chart Number: [id number]  Referring Physician: Bambi Yang*  Diagnosis: No diagnosis found.  ICD 10: ***  Involved Side: ***  Hand Dominance: ***  Date of Onset: ***  Date of Injury/Surgery: ***  Surgical Procedure: ***  Mechanism of Injury: ***  Additional Info: ***  Date of Return to MD: ***  Past Medical History/Comorbidities:   Past Medical History:   Diagnosis Date    Arthritis        Prior Functional Status: ***  Occupation: ***  Employer: ***  Job Duties/Responsibilities: ***  Current Work Status: ***  Home Environment: ***  Leisure/Social Activities: ***  Cultural/Spiritual: ***  Barriers to Learning: ***  Hearing/Vision Loss: ***    Visit #: *** of ***. Visits  on ***.    Subjective   "***"  Pain Report (severity and location)  Current: {PAIN 0-10:08932} out of 10  With activity: {PAIN 0-10:89654} out of 10   Report of Functional Deficits/Chief Complaints: ***    Objective   Observation: ***    Edema. Measured in centimeters.  Date 10/8/2018 10/8/2018    Left Right   2in. Above elbow     2in. Below elbow     Wrist Crease     Figure of 8     MCPs       Edema. Measured in centimeters.   10/8/2018 10/8/2018    Left Right   Index:       P1      PIP     P2      DIP     P3     Long:       P1      PIP     P2      DIP     P3     Ring:       P1                 PIP                P2                  DIP     P3     Small:        P1                 PIP            P2             DIP     P3     Thumb:     Prox. Phalanx     IP     Distal Phalanx         Elbow ROM. Measured in degrees.  Date 10/8/2018 10/8/2018    Left Right   Elbow Ext/Flex         Wrist ROM. Measured in degrees.  Date 10/8/2018 10/8/2018    Left Right   Supination/Pronation     Wrist ext/flex     Wrist RD/UD         Hand ROM. Measured in degrees.  Date 10/8/2018 10/8/2018    Left Right        Index: " MP                 PIP                    DIP                COONEY          Long:  MP                PIP                DIP                COONEY          Ring:   MP                PIP                DIP                COONEY          Small:  MP                 PIP                 DIP                COONEY          Thumb: MP                  IP         Rad ADD/ABD         Pal ADD/ABD            Opposition          Strength (Dyanmometer) and Pinch Strength (Pinch Gauge)  Measured in pounds.  Date 10/8/2018 10/8/2018    Left Right   Rung II     Burrell Pinch     3pt Pinch     2pt Pinch         Sensation   10/8/2018 10/8/2018    Left Right   [place]     Normal 1.65-2.83     Diminished Light Touch 3.22-3.61     Diminished Protective 3.84-4.31     Loss of Protective 4.56-6.65     Untestable >6.65     2 Point Discrimination     Static     Dynamic            Manual Muscle Test  Date 10/8/2018 10/8/2018    Left Right   Wrist Extension      Wrist Flexion     Radial Deviation     Ulnar Deviation     Supination     Pronation     EPL     FLP     OP     APL     Elbow Flexion/Flexion           Special Tests  Thumb CMC Grind Test    Finkelstein's Test    Phalen's Test    Tinel's Test    Kiran's Test    Extrinsic Tightness Test    Intrinsic Tightness Test    ORL Test    Froment's Sign    Ruth's Sign     Egawa Sign     Clamp Sign     Scaphoid Thrust Test    Linscheid's Test    Metacarpal Stress Test    Piano Key Test    ECU Synergy Test    Ulnar Compression Test    TFCC Load Test    Ulnocarpal Stress Test    Midcarpal Shift Test    Pisiform Boost Test    Tennis Elbow Test    Resisted Middle Finger Extension Test    Chair Test    Biceps Squeeze Test    Biceps Hook Test        Functional Limitation Reporting   Tool: FOTO  Category: ***  Visit DATE SCORE Current  G-Code Goal  G-Code   Intake 10/8/2018 ***/100 *** ***   5TH 10TH       Discharge            Treatment   ***  Issued and reviewed the above treatment to be completed by  patient as HEP ***x/day.     Charges   Start Time: ***  End Time: ***  Total Time: ***  Initial eval-33943 ***   Fluidotherapy-74933    Paraffin-12824    Moist Hot Pack-11154    Ultrasound-31399    Therapeutic Exercise-69288    Therapeutic Activity-49849    Manual Therapy-11966        Assessment   Patient is a 63 y.o. year old {MALE/FEMALE:94381} with a diagnosis of ***. Limitations affecting independence with the patient's normal, daily routine include ***      Profile and History Assessment of Occupational Performance Level of Clinical Decision Making Complexity Score   Occupational Profile:   Prasanna Scott is a 63 y.o. male who {LIVES WITH:34175} and is currently {Work history:15558} as ***.     Prasanna Scott has difficulty with  {ADLs:84438}  {IADLs:23109}  affecting his/her daily functional abilities. His/her main goal for therapy is ***.     Comorbidities:   {Co-morbidities:30557}    Medical and Therapy History Review:   {History Review:78605}               Performance Deficits    Physical:  {Physical:82707}    Cognitive:  No Deficits    Psychosocial:    {Psychosocial:95340}     Clinical Decision Making:  Low    Assessment Process:  Problem-Focused Assessments    Body Structures:  Related to movement    Body Functions:  Musculoskeletal Functions  Movement Functions  Skin Functions  Sensory Functions  Neuromuscular Functions  Cardiovascular Functions  Mental Functions  Voice/Speech Functions    Modification/Need for Assistance:  ***    Intervention Selection:  Limited, Several, or Multiple Treatment Options       low  Based on PMHX, co morbidities , data from assessments and functional level of assistance required with task and clinical presentation directly impacting function.       Goals       Goals to be met in 4 weeks: (***)  - Initiate Hep   - Pt to increase AROM of *** with ext/flx by 5 degrees by 4 weeks.  - Pt to increase / pinch strength by 5 pounds by 4 weeks.  - Pt to decrease pain to less  than or equal to 3/10 by 4 weeks.   - Patient will be able to achieve less than or equal to 30% on the FOTO, demonstrating overall improved functional ability with upper extremity.      Goals to be met by discharge:  - Independent with HEP  - Pt to increase AROM of *** to WNL as compared to R *** by d/c.   - Pt to increase strength by 10 pounds or WNL as compared to RUE by d/c.   - Pt to decrease pain to trace or none by d/c.   - Patient will be able to achieve less than or equal to 20% on the FOTO, demonstrating overall improved functional ability with upper extremity.       Plan     Initiate skilled occupational therapy services ***x/week for *** weeks from 10/8/2018 to ***.    Treatment interventions to include:  Heat modalities (41712 or 46922 or 09609)  Cold modalities (30597)  Pain management modalities (11061)  Scar management (23264 or 63007)  Edema control techniques (20923)  Manual therapy (63597)  Splinting (pending L code or 64188 or 25289)  Therapeutic exercises (44214)  Therapeutic activities (61051)  ADL training (64282 or 18048 or 47769)  Work simulation/Work conditioning (94398 or 86282)  Astym and/or IASTM (06573)  Strengthening and Endurance training (02133 or 07598 or 13758 or 97139)  Kinesiotaping (22900)  HEP instruction (41799)   NMES 60833 or (94277)  Patient/Caregiver instruction (68654)    Therapist: LEXI Melara, OTR/L     I certify the need for these services furnished under this plan of treatment and while under my care    ____________________________________                         __________________  Physician/Referring Practitioner                                               Date of Signature

## 2018-10-15 ENCOUNTER — CLINICAL SUPPORT (OUTPATIENT)
Dept: REHABILITATION | Facility: HOSPITAL | Age: 63
End: 2018-10-15
Payer: MEDICAID

## 2018-10-15 PROCEDURE — 97530 THERAPEUTIC ACTIVITIES: CPT | Mod: PN

## 2018-10-15 NOTE — PROGRESS NOTES
"  Occupational Therapy Daily Note - Hand, Wrist, and/or Elbow Condition     Date: 10/15/2018  Name: Prasanna Scott  YOB: 1955  Chart Number: [id number]  Referring Physician: Bambi Yang*  ICD 10: M79.644 (ICD-10-CM) - Pain in right finger  Involved Side: right thumb  Hand Dominance: right  Date of Onset: 6 months ago  Date of Injury/Surgery: none  Surgical Procedure: none  Mechanism of Injury: Insidious  Additional Info: Pt referred to therapy for thumb pain  Date of Return to MD:   Prior Functional Status: Full  Occupation: retired   Employer: disability  Home Environment: Pt lives with girlfriend   Leisure/Social Activities: None  Cultural/Spiritual: no barrier  Barriers to Learning: none   Hearing/Vision Loss: none    Visit #: . Visits  on 18.    Subjective   "It has good days and bad days, today is ok"   Pain Report (severity and loca tion)  Current: 2 out of 10  With activity: 3  out of 10   Report of Functional Deficits/Chief Complaints: Carrying a glass of water       Functional Limitation Reporting   Tool: FOTO  Category: self care  Visit DATE SCORE Current  G-Code Goal  G-Code   Intake 10/15/2018 62/100 CK Cl 32/100   5TH       10TH       Discharge            Treatment/objective      Patient received paraffin bath to right hand(s) for 8 minutes to increase blood flow, circulation, pain management and for tissue elasticity prior to therex.   Patient received ultrasound to first CMC joint area to increase blood flow, circulation, tissue elasticity, pain management and for wound/scar management for 8 minutes @ 3.3 Mhz, Intensity .08 w/cm2 at 100% duty cycle.  Gentle thenar mobilization   Carpal mobilization     Theraputty: soft yellow: gross grasp 2 min, flatten into omero and use highligher to dimple x 2 min, roll into snake chop using scrapping tool. X 2 min. Roll into snake and pinch 3 pt/2 pt x 2 min, intrinsic scissoring finger/abd/add x 2min  Wrist " extension and flexion and radial deviation x 20 each 2 lbs   Wrist roller x 3 lbs x 5 reps   Ice following therex     KT tape to improve CMC ligament   Pt provided with yellow t-putty for HEP to perform HEP below        Charges   Start Time: 11 am   End Time: 11:45 am  Total Time: 45      Initial eval-86119    Fluidotherapy-14695    Paraffin-97018 x8   Moist Hot Pack-74813    Ultrasound-97035 x8   Therapeutic Exercise-97110 x14   Therapeutic Activity-87540    Manual Therapy-97140 x10       Assessment   Patient is a 63 y.o. year old male with a diagnosis of Finger pain. Pt arrives to therapy with symptoms consistent with CMC joint OA. Continued improvement with opposition and improved body mechanics. Pt arrives with continued CMC pain. Kishan for fine motor activities appears improved. Pt was able to opposed small pinch pins with few failures.Finding small beads  Limitations affecting independence with the patient's normal, daily routine include grabbing objects, opening jars and driving.         Plan     Cont. skilled occupational therapy services 1x/week for 6-8 weeks from 10/15/2018 to 11/12/18.      Therapist: LEXI Melara, OTR/L     I certify the need for these services furnished under this plan of treatment and while under my care

## 2018-10-26 ENCOUNTER — CLINICAL SUPPORT (OUTPATIENT)
Dept: REHABILITATION | Facility: HOSPITAL | Age: 63
End: 2018-10-26
Payer: MEDICAID

## 2018-10-26 PROCEDURE — 97530 THERAPEUTIC ACTIVITIES: CPT | Mod: PN

## 2018-10-26 NOTE — PROGRESS NOTES
"  Occupational Therapy Daily Note - Hand, Wrist, and/or Elbow Condition     Date: 10/26/2018  Name: Prasanna Scott  YOB: 1955  Chart Number: [id number]  Referring Physician: Bambi Yang*  ICD 10: M79.644 (ICD-10-CM) - Pain in right finger  Involved Side: right thumb  Hand Dominance: right  Date of Onset: 6 months ago  Date of Injury/Surgery: none  Surgical Procedure: none  Mechanism of Injury: Insidious  Additional Info: Pt referred to therapy for thumb pain  Date of Return to MD:   Prior Functional Status: Full  Occupation: retired   Employer: disability  Home Environment: Pt lives with girlfriend   Leisure/Social Activities: None  Cultural/Spiritual: no barrier  Barriers to Learning: none   Hearing/Vision Loss: none    Visit #:  20. Visits  on 18.    Subjective   "It just feels so swollen today, It feels great after therapy but then worse after a few days"   Pain Report (severity and loca tion)  Current: 4 out of 10  With activity: 6  out of 10   Report of Functional Deficits/Chief Complaints: Carrying a glass of water       Functional Limitation Reporting   Tool: FOTO  Category: self care  Visit DATE SCORE Current  G-Code Goal  G-Code   Intake 10/26/2018 62/100 CK Cl 32/100   5TH       10TH       Discharge            Treatment/objective      Patient received paraffin bath to right hand(s) for 8 minutes to increase blood flow, circulation, pain management and for tissue elasticity prior to therex.   Patient received ultrasound to first CMC joint area to increase blood flow, circulation, tissue elasticity, pain management and for wound/scar management for 8 minutes @ 3.3 Mhz, Intensity .08 w/cm2 at 100% duty cycle.  Gentle thenar mobilization   Patient receives IFC electrical stimulation for pain control applied to muscle belly of ABP + thenar eminac,   frequency = 105,  @ 2 scan,  for 10 minutes.       Carpal mobilization       Ice following therex     KT tape to " improve CMC ligament   Pt provided with yellow t-putty for HEP to perform HEP below        Charges   Start Time: 10:30 am   End Time: 11:10 am  Total Time: 40      Initial eval-48358    Fluidotherapy-43198    Paraffin-97018 x8   Moist Hot Pack-75298    Ultrasound-97035 x8   Therapeutic Exercise-97110 x15   Therapeutic Activity-56560    Manual Therapy-97140 x10       Assessment   Patient is a 63 y.o. year old male with a diagnosis of Finger pain. Pt arrives to therapy with symptoms consistent with CMC joint OA. Pt remains painful along the first dorsal compartment and basal joint. Joint inflammation appears worse. Pt reporting only temporary and short lived benefit form therapy. Because he utilizes his right hand for all ADL's the patient is unable to fully rest. IFC and ice were beneficial in reducing pain moderately. Pt will attempt to get referral for orthopedic surgeon to discuss possible surgical intervention for the future.  Limitations affecting independence with the patient's normal, daily routine include grabbing objects, opening jars and driving.       Plan     Cont. skilled occupational therapy services 1x/week for 6-8 weeks from 10/26/2018 to 11/12/18.      Therapist: LEXI Melara, OTR/L     I certify the need for these services furnished under this plan of treatment and while under my care

## 2018-10-31 ENCOUNTER — CLINICAL SUPPORT (OUTPATIENT)
Dept: REHABILITATION | Facility: HOSPITAL | Age: 63
End: 2018-10-31
Payer: MEDICAID

## 2018-10-31 PROCEDURE — 97018 PARAFFIN BATH THERAPY: CPT | Mod: PN

## 2018-10-31 PROCEDURE — 97032 APPL MODALITY 1+ESTIM EA 15: CPT | Mod: PN

## 2018-10-31 PROCEDURE — 97140 MANUAL THERAPY 1/> REGIONS: CPT | Mod: PN

## 2018-10-31 NOTE — PROGRESS NOTES
"  Occupational Therapy Daily Note - Hand, Wrist, and/or Elbow Condition     Date: 10/31/2018  Name: Prasanna Scott  YOB: 1955  Chart Number: [id number]  Referring Physician: Bambi Yang*  ICD 10: M79.644 (ICD-10-CM) - Pain in right finger  Involved Side: right thumb  Hand Dominance: right  Date of Onset: 6 months ago  Date of Injury/Surgery: none  Surgical Procedure: none  Mechanism of Injury: Insidious  Additional Info: Pt referred to therapy for thumb pain  Date of Return to MD:   Prior Functional Status: Full  Occupation: retired   Employer: disability  Home Environment: Pt lives with girlfriend   Leisure/Social Activities: None  Cultural/Spiritual: no barrier  Barriers to Learning: none   Hearing/Vision Loss: none    Visit #: . Visits  on 18.    Subjective   "It has been feeling great this week"   Pain Report (severity and loca tion)  Current: 2 out of 10  With activity: 4 out of 10   Report of Functional Deficits/Chief Complaints: Carrying a glass of water       Functional Limitation Reporting   Tool: FOTO  Category: self care  Visit DATE SCORE Current  G-Code Goal  G-Code   Intake 10/31/2018 62/100 CK Cl 32/100   5TH       10TH       Discharge            Treatment/objective      Patient received paraffin bath to right hand(s) for 8 minutes to increase blood flow, circulation, pain management and for tissue elasticity prior to therex.     Patient receives IFC electrical stimulation for pain control applied to muscle belly of ABP + thenar eminac,   frequency = 105,  @ 2 scan,  for 10 minutes.       Carpal mobilization     Theraputty: soft yellow: gross grasp 2 min, flatten into omero and use highligher to dimple x 2 min, roll into snake chop using scrapping tool. X 2 min. Roll into snake and pinch 3 pt/2 pt x 2 min, intrinsic scissoring finger/abd/add x 2min  Wrist extension and flexion and radial deviation x 20 each 2 lbs   Wrist roller x 3 lbs x 5 reps   Ice " following therex     KT tape to improve CMC ligament   Pt provided with yellow t-putty for HEP to perform HEP below        Charges   Start Time: 10:00 am   End Time: 10:40 am  Total Time: 40      Initial eval-71287    Fluidotherapy-99394    Paraffin-97018 x8   Moist Hot Pack-43917    Ultrasound-97035 x8   Therapeutic Exercise-97110 x15   Therapeutic Activity-05928    Manual Therapy-97140 x10       Assessment   Patient is a 63 y.o. year old male with a diagnosis of Finger pain. Pt arrives to therapy with symptoms consistent with CMC joint OA. Pt remains painful along the first dorsal compartment and basal joint. Joint inflammation appears improved. Pt reports much benefit from IFC. Implementation of therex reduced to maintain current pain status. Pt has improved mobility in thumb MP joint. Limitations affecting independence with the patient's normal, daily routine include grabbing objects, opening jars and driving.       Plan     Cont. skilled occupational therapy services 1x/week for 6-8 weeks from 10/31/2018 to 11/12/18.      Therapist: LEXI Melara, OTR/L     I certify the need for these services furnished under this plan of treatment and while under my care

## 2018-11-05 ENCOUNTER — TELEPHONE (OUTPATIENT)
Dept: ORTHOPEDICS | Facility: CLINIC | Age: 63
End: 2018-11-05

## 2018-11-05 NOTE — TELEPHONE ENCOUNTER
----- Message from Ayanna Velez sent at 11/5/2018  4:13 PM CST -----  Contact: [phone]/self  Dr. Yang called to schedule an appt for patient to be seen for right finger pain, please call pt to schedule.

## 2018-11-06 ENCOUNTER — TELEPHONE (OUTPATIENT)
Dept: ORTHOPEDICS | Facility: CLINIC | Age: 63
End: 2018-11-06

## 2018-11-06 DIAGNOSIS — R52 PAIN: Primary | ICD-10-CM

## 2018-11-06 NOTE — TELEPHONE ENCOUNTER
----- Message from Sal Guerrero sent at 11/6/2018  2:17 PM CST -----  Contact: [phone]  Patient called in returning your call. Please call.

## 2018-11-06 NOTE — TELEPHONE ENCOUNTER
----- Message from Cassidy Jcarlos sent at 11/6/2018  1:56 PM CST -----  Contact: DIEGO ANDREW [[id number]]            Name of Who is Calling: DIEGO ANDREW [[id number]]    What is the request in detail: Patient returned a call from the office. Please call him back.     Can the clinic reply by [facility]: no         What Number to Call Back if not in [facility]: [phone]

## 2018-11-07 ENCOUNTER — CLINICAL SUPPORT (OUTPATIENT)
Dept: REHABILITATION | Facility: HOSPITAL | Age: 63
End: 2018-11-07
Payer: MEDICAID

## 2018-11-07 DIAGNOSIS — M79.646 PAIN OF FINGER, UNSPECIFIED LATERALITY: ICD-10-CM

## 2018-11-07 PROCEDURE — 97033 APP MDLTY 1+IONTPHRSIS EA 15: CPT | Mod: PN

## 2018-11-07 PROCEDURE — 97110 THERAPEUTIC EXERCISES: CPT | Mod: PN

## 2018-11-07 PROCEDURE — 97140 MANUAL THERAPY 1/> REGIONS: CPT | Mod: PN

## 2018-11-07 NOTE — PROGRESS NOTES
"  Occupational Therapy Daily Note - Hand, Wrist, and/or Elbow Condition     Date: 2018  Name: Prasanna Scott  YOB: 1955  Chart Number: [id number]  Referring Physician: Bambi Yang*  ICD 10: M79.644 (ICD-10-CM) - Pain in right finger  Involved Side: right thumb  Hand Dominance: right  Date of Onset: 6 months ago  Date of Injury/Surgery: none  Surgical Procedure: none  Mechanism of Injury: Insidious  Additional Info: Pt referred to therapy for thumb pain  Date of Return to MD: , Dr. Lincoln  Prior Functional Status: Full  Occupation: retired   Employer: disability  Home Environment: Pt lives with girlfriend   Leisure/Social Activities: None  Cultural/Spiritual: no barrier  Barriers to Learning: none   Hearing/Vision Loss: none    Visit #: . Visits  on 18.    Subjective   "I hadn't hurt it two weeks. Gaye been doing things differently"   Pain Report (severity and loca tion)  Current: 2 out of 10  With activity: 4 out of 10   Report of Functional Deficits/Chief Complaints: Carrying a glass of water       Functional Limitation Reporting   Tool: FOTO  Category: self care  Visit DATE SCORE Current  G-Code Goal  G-Code   Intake 2018 62/100 CK Cl 32/100   5TH       10TH       Discharge            Treatment/objective      Patient received paraffin bath to right hand(s) for 8 minutes to increase blood flow, circulation, pain management and for tissue elasticity prior to therex.          Patient receives IFC electrical stimulation for pain control applied to muscle belly of ABP + thenar eminac,   frequency = 105,  @ 2 scan,  for 10 minutes.       Carpal mobilization     Theraputty: soft yellow: gross grasp 2 min, flatten into omero and use highligher to dimple x 2 min, roll into snake chop using scrapping tool. X 2 min. Roll into snake and pinch 3 pt/2 pt x 2 min, intrinsic scissoring finger/abd/add x 2min  Wrist extension and flexion and radial deviation x 20 each 2 lbs "   Wrist roller x 3 lbs x 5 reps   Ice following therex     KT tape to improve CMC ligament   Pt provided with yellow t-putty for HEP to perform HEP below        Charges   Start Time: 11:00 am   End Time: 11:40 am  Total Time: 40      Initial eval-[id number]    Fluidotherapy-10755    Paraffin-97018 x8   Moist Hot Pack-29670    Ultrasound-97035 x8   Therapeutic Exercise-97110 x15   Therapeutic Activity-32098    Manual Therapy-97140 x10       Assessment   Patient is a 63 y.o. year old male with a diagnosis of Finger pain. Pt arrives to therapy with symptoms consistent with CMC joint OA. Pt will be attending MD ruddy to address thumb CMC joint pain with Dr. Lincoln. He continues to have relief from therapy intervention for last 2 weeks. Implementation of therex reduced to maintain current pain status. Pt has improved mobility in thumb MP joint. Limitations affecting independence with the patient's normal, daily routine include grabbing objects, opening jars and driving.       Plan     Cont. skilled occupational therapy services 1x/week for 6-8 weeks from 11/7/2018 to 11/12/18.      Therapist: LEXI Melara, OTR/L     I certify the need for these services furnished under this plan of treatment and while under my care

## 2018-11-14 ENCOUNTER — CLINICAL SUPPORT (OUTPATIENT)
Dept: REHABILITATION | Facility: HOSPITAL | Age: 63
End: 2018-11-14
Payer: MEDICAID

## 2018-11-14 PROCEDURE — 97530 THERAPEUTIC ACTIVITIES: CPT | Mod: PN

## 2018-11-14 NOTE — PROGRESS NOTES
"  Occupational Therapy Daily Note - Hand, Wrist, and/or Elbow Condition     Date: 2018  Name: Prasanna Scott  YOB: 1955  Chart Number: [id number]  Referring Physician: Bambi Yang*  ICD 10: M79.644 (ICD-10-CM) - Pain in right finger  Involved Side: right thumb  Hand Dominance: right  Date of Onset: 6 months ago  Date of Injury/Surgery: none  Surgical Procedure: none  Mechanism of Injury: Insidious  Additional Info: Pt referred to therapy for thumb pain  Date of Return to MD: , Dr. Lincoln  Prior Functional Status: Full  Occupation: retired   Employer: disability  Home Environment: Pt lives with girlfriend   Leisure/Social Activities: None  Cultural/Spiritual: no barrier  Barriers to Learning: none   Hearing/Vision Loss: none    Visit #: . Visits  on 18.    Subjective   "Its still been feeling good"   Pain Report (severity and location)  Current: 1 out of 10  With activity: 3 out of 10   Report of Functional Deficits/Chief Complaints: Carrying a glass of water       Treatment/objective      Patient received paraffin bath to right hand(s) for 8 minutes to increase blood flow, circulation, pain management and for tissue elasticity prior to therex.   Patient received ultrasound to first CMC joint area to increase blood flow, circulation, tissue elasticity, pain management and for wound/scar management for 8 minutes @ 3.3 Mhz, Intensity .08 w/cm2 at 100% duty cycle.  Gentle thenar mobilization        Patient receives IFC electrical stimulation for pain control applied to muscle belly of ABP + thenar eminac,   frequency = 105,  @ 2 scan,  for 10 minutes.       Carpal mobilization     Theraputty: soft yellow: gross grasp 2 min, flatten into omero and use highligher to dimple x 2 min, roll into snake chop using scrapping tool. X 2 min. Roll into snake and pinch 3 pt/2 pt x 2 min, intrinsic scissoring finger/abd/add x 2min  Wrist extension and flexion and radial " deviation x 20 each 2 lbs   Wrist roller x 3 lbs x 5 reps   Ice following therex     KT tape to improve CMC ligament   Pt provided with yellow t-putty for HEP to perform HEP below        Charges   Start Time: 11:00 am   End Time: 11:45 am  Total Time: 45      Initial eval-[id number]    Fluidotherapy-75780    Paraffin-97018 x8   Moist Hot Pack-56943    Ultrasound-97035 x8   Therapeutic Exercise-97110 x15   Therapeutic Activity-62614    Manual Therapy-97140 x15       Assessment   Patient is a 63 y.o. year old male with a diagnosis of Finger pain. Pt arrives to therapy with symptoms consistent with CMC joint OA. Pt continues to report success with pain management and decreased functional limitations 2' to use of adaptive strategics and splinting. Pt has improved mobility in thumb MP joint. Limitations affecting independence with the patient's normal, daily routine include grabbing objects, opening jars and driving.       Plan     Cont. skilled occupational therapy services 1x/week  from 11/14/2018 to 2/11/18.      Therapist: LEXI Melara, OTR/L     I certify the need for these services furnished under this plan of treatment and while under my care

## 2018-11-20 ENCOUNTER — CLINICAL SUPPORT (OUTPATIENT)
Dept: REHABILITATION | Facility: HOSPITAL | Age: 63
End: 2018-11-20
Payer: MEDICAID

## 2018-11-20 DIAGNOSIS — M79.644 PAIN OF FINGER OF RIGHT HAND: ICD-10-CM

## 2018-11-20 PROCEDURE — 97530 THERAPEUTIC ACTIVITIES: CPT | Mod: PN

## 2018-11-20 NOTE — PROGRESS NOTES
"  Occupational Therapy Daily Treatment Note     Date: 11/20/2018  Name: Prasanna Scott  Clinic Number: [id number]    Therapy Diagnosis:   1. Pain of finger of right hand         Physician: Bambi Yang*    Physician Orders: Evaluate and Treat  Medical Diagnosis: M79.644 (ICD-10-CM) - Pain in right finger  Surgical Procedure and Date: NA  Evaluation Date: 9/12/18  Insurance Authorization Period Expiration: 12/14/18  Plan of Care Certification Period: 11/14/18-2/14/19  Date of Return to MD: Unknown, Pt meeting with Dr. Lincoln for second opinion     Visit # / Visits authorized: 10 / 20  Time In:12:00 pm  Time Out: 12:40  Total Billable Time: 40 minutes    Precautions:  Standard      Subjective     Pt reports: "its started hurting a bit more again, sometimes even with the brace"   he was compliant with home exercise program given last session.   Response to previous treatment:Pt continues to be pain free overall and has remained compliant with joint protective strategies and activity modification techniques   Functional change: Pt reports decreased pain with dish washing and tieing shoes     Pain: 4/10  Location: right hands      Objective     Patient received paraffin bath to right hand(s) for 8 minutes to increase blood flow, circulation, pain management and for tissue elasticity prior to therex.   Patient received ultrasound to first CMC joint area to increase blood flow, circulation, tissue elasticity, pain management and for wound/scar management for 8 minutes @ 3.3 Mhz, Intensity .08 w/cm2 at 100% duty cycle.  Gentle thenar mobilization          Patient receives IFC electrical stimulation for pain control applied to muscle belly of ABP + thenar eminac,   frequency = 105,  @ 2 scan,  for 10 minutes.         Carpal mobilization      Theraputty: soft yellow: gross grasp 2 min, flatten into omero and use highligher to dimple x 2 min, roll into snake chop using scrapping tool. X 2 min. Roll into snake and " pinch 3 pt/2 pt x 2 min, intrinsic scissoring finger/abd/add x 2min  Wrist extension and flexion and radial deviation x 20 each 2 lbs   Wrist roller x 3 lbs x 5 reps   Ice following therex      KT tape to improve CMC ligament     Home Exercises and Education Provided     Education provided:   - Handout regarding joint protective strategies   - Progress towards goals     Written Home Exercises Provided: yes.  Exercises were reviewed and Prasanna was able to demonstrate them prior to the end of the session.  Prasanna demonstrated good  understanding of the HEP provided.   .   See EMR under Patient Instructions for exercises provided 11/20/2018.        Assessment     Pt would continue to benefit from skilled OT. Pt arrives for his 11th therapy session. Pain and progress continue to be inconsistent. The patient has made great progress with activity modifications, splinting and joint protective strategies. Pt has expressed interest in future surgical options and injections. The patient continues to have deficits with tieing shoelaces and donning belt/zippers. Therapy services recommended to continue at this time.     Prasanna is progressing well towards his goals and there are no updates to goals at this time. Pt prognosis is Excellent.     Pt will continue to benefit from skilled outpatient occupational therapy to address the deficits listed in the problem list on initial evaluation provide pt/family education and to maximize pt's level of independence in the home and community environment.     Anticipated barriers to occupational therapy: degenerative nature of disorder     Pt's spiritual, cultural and educational needs considered and pt agreeable to plan of care and goals.    Goals:     Goals to be met in 4 weeks: (9/12/18)  1) Initiate Hep Goal met  2) Pt to increase AROM of thumb with ext/flx by 5 degrees by 4 weeks. Goal met  3) Pt to increase / pinch strength by 5 pounds by 4 weeks. Goal met  4) Pt to decrease pain to  less than or equal to 3/10 by 4 weeks. Goal met  5) Patient will be able to achieve less than or equal to 30% on the FOTO, demonstrating overall improved functional ability with upper extremity. In progress     Goals to be met by discharge:  1) Independent with HEP In progress  2) Pt to increase AROM of thumb to WNL as compared to R wrist by d/c. To improve patients ability to utilize buttons and snaps. In progress  3) Pt to increase strength by 10 pounds or WNL as compared to RUE by d/c. To improve patients ability to perform meal prep to include stabilizing knife for chopping vegetables.   4) Pt to decrease pain to trace or none by d/c.   5) Patient will be able to achieve less than or equal to 20% on the FOTO, demonstrating overall improved functional ability with upper extremity.        Plan   Cont. skilled occupational therapy services 1x/week  from 11/14/2018 to 2/14/18.  Updates/Grading for next session: follow up with MD ruddy Roche, OT

## 2018-11-21 ENCOUNTER — OFFICE VISIT (OUTPATIENT)
Dept: ORTHOPEDICS | Facility: CLINIC | Age: 63
End: 2018-11-21
Attending: ORTHOPAEDIC SURGERY
Payer: MEDICAID

## 2018-11-21 ENCOUNTER — TELEPHONE (OUTPATIENT)
Dept: ORTHOPEDICS | Facility: CLINIC | Age: 63
End: 2018-11-21

## 2018-11-21 ENCOUNTER — HOSPITAL ENCOUNTER (OUTPATIENT)
Dept: RADIOLOGY | Facility: OTHER | Age: 63
Discharge: HOME | End: 2018-11-21
Attending: ORTHOPAEDIC SURGERY
Payer: MEDICAID

## 2018-11-21 VITALS — WEIGHT: 170 LBS | BODY MASS INDEX: 25.18 KG/M2 | HEIGHT: 69 IN

## 2018-11-21 DIAGNOSIS — R52 PAIN: ICD-10-CM

## 2018-11-21 DIAGNOSIS — M18.11 PRIMARY OSTEOARTHRITIS OF FIRST CARPOMETACARPAL JOINT OF RIGHT HAND: ICD-10-CM

## 2018-11-21 PROCEDURE — 99999 PR PBB SHADOW E&M-EST. PATIENT-LVL II: CPT | Mod: PBBFAC,,, | Performed by: ORTHOPAEDIC SURGERY

## 2018-11-21 PROCEDURE — 99212 OFFICE O/P EST SF 10 MIN: CPT | Mod: PBBFAC,25 | Performed by: ORTHOPAEDIC SURGERY

## 2018-11-21 PROCEDURE — 99203 OFFICE O/P NEW LOW 30 MIN: CPT | Mod: S$PBB,25,, | Performed by: ORTHOPAEDIC SURGERY

## 2018-11-21 PROCEDURE — 20600 DRAIN/INJ JOINT/BURSA W/O US: CPT | Mod: S$PBB,F5,, | Performed by: ORTHOPAEDIC SURGERY

## 2018-11-21 PROCEDURE — 20600 DRAIN/INJ JOINT/BURSA W/O US: CPT | Mod: PBBFAC,RT | Performed by: ORTHOPAEDIC SURGERY

## 2018-11-21 PROCEDURE — 73130 X-RAY EXAM OF HAND: CPT | Mod: 26,RT,, | Performed by: RADIOLOGY

## 2018-11-21 PROCEDURE — 73130 X-RAY EXAM OF HAND: CPT | Mod: TC,FY,RT

## 2018-11-21 RX ORDER — CELECOXIB 200 MG/1
200 CAPSULE ORAL DAILY
Qty: 30 CAPSULE | Refills: 3 | Status: SHIPPED | OUTPATIENT
Start: 2018-11-21 | End: 2018-12-21

## 2018-11-21 RX ORDER — TRIAMCINOLONE ACETONIDE 40 MG/ML
20 INJECTION, SUSPENSION INTRA-ARTICULAR; INTRAMUSCULAR
Status: COMPLETED | OUTPATIENT
Start: 2018-11-21 | End: 2018-11-21

## 2018-11-21 RX ADMIN — TRIAMCINOLONE ACETONIDE 20 MG: 40 INJECTION, SUSPENSION INTRA-ARTICULAR; INTRAMUSCULAR at 01:11

## 2018-11-21 NOTE — PROGRESS NOTES
INITIAL VISIT HISTORY:  A 63-year-old male presents for evaluation of right hand   and thumb pain of several years' duration.  He has been treated in the past   with injections and braces with only slight improvement.  Symptoms are getting   worse recently since he has been using the right hand quite a bit.  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  No recent trauma reported.    No numbness or tingling.    PAST MEDICAL HISTORY:  Significant for arthritis.    PAST SURGICAL HISTORY:  Includes arm surgery, neck surgery, carpal tunnel   release and ulnar nerve transposition.    FAMILY HISTORY:  Negative.    SOCIAL HISTORY:  The patient is a former smoker, drinks about five beers per   week.    REVIEW OF SYSTEMS:  Negative fever, chills, rashes.    CURRENT MEDICATIONS:  Reviewed on chart.    ALLERGIES:  None.    PHYSICAL EXAMINATION:  GENERAL:  Well-developed, well-nourished male in no acute distress, alert and   oriented x3.  MUSCULOSKELETAL:  Examination of the upper extremities significant for the right   hand, demonstrating a bony deformity at the base of the right thumb at the CMC   joint with adduction contracture and very limited range of motion of the CMC   joint, but he does have a positive grind test.   strength is decreased.    Sensation intact.  Tinel sign negative.    X-RAYS:  AP and lateral right hand reviewed, demonstrate severe arthritic change   of the right thumb base CMC joint with near complete fusion.    IMPRESSION:  CMC arthritis, right thumb, severe.    PLAN:  I explained the nature of the problem to the patient.  Recommended we try   an injection.  After pause for timeout, he identified the right thumb base,   injected CMC joint with combination of Kenalog 20 mg, 0.5 mL Xylocaine, sterile   technique.  He tolerated the procedure well without complication.    I have also recommend that we start him on  Celebrex 200 mg once a day with food   and I would also like him to consider surgical treatment for this problem.  He   would be a good candidate, but if he chooses not to have surgery, he may   eventually auto-fuse, which would eliminate his pain as well.  We discussed that   briefly.  Follow up in 1-2 months.      ROEL  dd: 11/21/2018 13:44:08 (CST)  td: 11/22/2018 01:11:18 (CST)  Doc ID   #[id number]  Job ID #[id number]    CC:

## 2018-11-27 NOTE — PROGRESS NOTES
"  Occupational Therapy Daily Treatment Note     Date: 11/28/2018  Name: Prasanna Scott  Clinic Number: [id number]    Therapy Diagnosis:   1. Pain of finger of right hand           Physician: Bambi Yang PC    Physician Orders: Evaluate and Treat  Medical Diagnosis: M79.644 (ICD-10-CM) - Pain in right finger  Surgical Procedure and Date: Cortizone injection, 11/21  Evaluation Date: 9/12/18  Insurance Authorization Period Expiration: 12/14/18  Plan of Care Certification Period: 11/14/18-2/14/19  Date of Return to MD: Unknown, Pt meeting with Dr. Lincoln for second opinion     Visit # / Visits authorized: 12 / 20  Time In: 11:00 am  Time Out: 11:40 am  Total Billable Time: 40 minutes    Precautions:  Standard      Subjective     Pt reports: "He injected me and Dr. Lincoln wants me to stop therapy" Pt reports he will DC after next two sessions"    he was compliant with home exercise program given last session.   Response to previous treatment: Pt reports consistent relief from therapy activities. Cortizone injection performed by MD has temporarily decreased pain  Functional change: Pt reports decreased pain with dish washing and tieing shoes     Pain: 2/10  Location: right hands      Objective     Supervised modalities 5 minutes    Patient received paraffin bath to right hand(s) for 8 minutes to increase blood flow, circulation, pain management and for tissue elasticity prior to therex.     Direct contact modalities 15 minutes    Patient received ultrasound to first CMC joint area to increase blood flow, circulation, tissue elasticity, pain management and for wound/scar management for 8 minutes @ 3.3 Mhz, Intensity .08 w/cm2 at 100% duty cycle.  Gentle thenar mobilization          Patient receives IFC electrical stimulation for pain control applied to muscle belly of ABP + thenar eminac,   frequency = 105,  @ 2 scan,  for 10 minutes.     Manual therapy techniques 10 minutes  Carpal mobilization   Cross friction " massage over thumb CMC joint     Therapeutic activities  5 Minutes   KT tape to improve CMC ligament       Neuromuscular re-education 0 minutes    Home Exercises and Education Provided     Education provided:   - Handout regarding joint protective strategies   - Progress towards goals     Written Home Exercises Provided: Patient instructed to cont prior HEP.  Exercises were reviewed and Prasanna was able to demonstrate them prior to the end of the session.  Prasanna demonstrated good  understanding of the HEP provided.   .   See EMR under Patient Instructions for exercises provided 11/20/2018.        Assessment     Patient arrives to therapy following MD visit. He reports increased relief following injection to the CMC joint. Pt demonstrated good rolando. Of modalities and continues to have good carryover with joint protective principals. Pt remains complaint with splinting and also reports improved functional use of hand 2' to KT tape. Therapy services recommended two more visits till independent with HEP.    Prasanna is progressing well towards his goals and there are no updates to goals at this time. Pt prognosis is Excellent.     Pt will continue to benefit from skilled outpatient occupational therapy to address the deficits listed in the problem list on initial evaluation provide pt/family education and to maximize pt's level of independence in the home and community environment.     Anticipated barriers to occupational therapy: degenerative nature of disorder     Pt's spiritual, cultural and educational needs considered and pt agreeable to plan of care and goals.    Goals:     Goals to be met in 4 weeks: 12/14/18  1) Initiate Hep Goal met  2) Pt to increase AROM of thumb with ext/flx by 5 degrees by 4 weeks. Goal met  3) Pt to increase / pinch strength by 5 pounds by 4 weeks. Goal met  4) Pt to decrease pain to less than or equal to 3/10 by 4 weeks. Goal met  5) Patient will be able to achieve less than or equal to 30% on  the FOTO, demonstrating overall improved functional ability with upper extremity. In progress     Goals to be met by discharge:  1) Independent with HEP In progress  2) Pt to increase AROM of thumb to WNL as compared to R wrist by d/c. To improve patients ability to utilize buttons and snaps. In progress  3) Pt to increase strength by 10 pounds or WNL as compared to RUE by d/c. To improve patients ability to perform meal prep to include stabilizing knife for chopping vegetables.   4) Pt to decrease pain to trace or none by d/c.   5) Patient will be able to achieve less than or equal to 20% on the FOTO, demonstrating overall improved functional ability with upper extremity.        Plan   Cont. skilled occupational therapy services 1x/week  from 11/14/2018 to 2/14/19  DC pt in two sessions  .  Updates/Grading for next session: Finalize HEP       Brian Roche, OT

## 2018-11-28 ENCOUNTER — CLINICAL SUPPORT (OUTPATIENT)
Dept: REHABILITATION | Facility: HOSPITAL | Age: 63
End: 2018-11-28
Payer: MEDICAID

## 2018-11-28 DIAGNOSIS — M79.644 PAIN OF FINGER OF RIGHT HAND: ICD-10-CM

## 2018-11-28 PROCEDURE — 97530 THERAPEUTIC ACTIVITIES: CPT | Mod: PN

## 2018-12-11 NOTE — PROGRESS NOTES
"  Occupational Therapy Daily Treatment Note     Date: 12/12/2018  Name: Prasanna Scott  Clinic Number: [id number]    Therapy Diagnosis:   1. Pain of finger of right hand             Physician: Bambi Yang* PC  Dr. Lincoln  Physician Orders: Evaluate and Treat  Medical Diagnosis: M79.644 (ICD-10-CM) - Pain in right finger  Surgical Procedure and Date: Cortizone injection, 11/21  Evaluation Date: 9/12/18  Insurance Authorization Period Expiration: 12/14/18  Plan of Care Certification Period: 11/14/18-2/14/19  Date of Return to MD: Unknown, Pt meeting with Dr. Lincoln for second opinion     Visit # / Visits authorized: 12 / 20  Time In: 11:10 am  Time Out: 11:35 am  Total Billable Time: 25 minutes    Precautions:  Standard      Subjective     Pt reports: "Its still feeling good, I was sick last week" Patient arrives 10 min late to session    he was compliant with home exercise program given last session.   Response to previous treatment: Pt reports consistent relief from therapy activities. Cortizone injection performed by MD has temporarily decreased pain  Functional change: Pt reports decreased pain with dish washing and tieing shoes     Pain: 0/10  Location: right hand     Objective     Direct contact modalities 8 minutes    Patient received ultrasound to first CMC joint area to increase blood flow, circulation, tissue elasticity, pain management and for wound/scar management for 8 minutes @ 3.3 Mhz, Intensity .08 w/cm2 at 100% duty cycle.       Manual therapy techniques 12 minutes  Carpal mobilization   Cross friction massage over thumb CMC joint     Therapeutic activities  5 Minutes   KT tape to improve CMC ligament       Home Exercises and Education Provided     Education provided:   - Handout regarding joint protective strategies   Continue with previous HEP    Written Home Exercises Provided: Patient instructed to cont prior HEP.  Exercises were reviewed and Prasanna was able to demonstrate them and " verbally explain them prior to the end of the session.  Prasanna demonstrated good  understanding of the HEP provided.   .   See EMR under Patient Instructions for exercises provided 11/20/2018.        Assessment     Mr. Scott returns to therapy prior to his last visit. He continues to be independently managing symptoms and has good carryover of joint protective strategies overall. The patient reports minimal deficits with ADL's and IADL's. He reports he will continue to mange symptoms until he is unable and may seek surgical option. Therapy services recommended to continue to follow up with joint protective strategies for one more visit.     Prasanna is progressing well towards his goals and there are no updates to goals at this time. Pt prognosis is Excellent.     Pt will continue to benefit from skilled outpatient occupational therapy to address the deficits listed in the problem list on initial evaluation provide pt/family education and to maximize pt's level of independence in the home and community environment.     Anticipated barriers to occupational therapy: degenerative nature of disorder     Pt's spiritual, cultural and educational needs considered and pt agreeable to plan of care and goals.    Goals:     Goals to be met in 4 weeks: 12/14/18  1) Patient will be independent with Initial HEP to address pain, ROM and strength Goal Met 11/20/18  2) Pt to increase AROM of thumb with ext/flx by 5 degrees to increased patients participation with gripping a glass when drinking water. Goal Met 11/20/18  3) Pt to increase / pinch strength by 5 pounds  to improve patients ability to open tight jars.Goal Met 11/20/18  4) Pt to decrease pain to less than or equal to 3/10 when performing all ADL's Goal Met 11/20/18  5) Patient will be able to achieve less than or equal to 30% on the FOTO, demonstrating overall improved functional ability with upper extremity. In progress     Goals to be met by discharge:  1) The patient  will be independent with final HEP to independently address ROM, pain and strength. Not met   2) Pt to increase AROM of thumb to WNL as compared to R wrist by d/c. To improve patients ability to utilize buttons and snaps. Not Met  3) Pt to increase strength by 10 pounds or WNL as compared to RUE to improve patients ability to perform meal prep to include stabilizing knife for chopping vegetables by d/c. Not Met  4) Pt to decrease pain to trace or none when performing all ADL's and IADL's  by d/c. Not Met  5) Patient will be able to achieve less than or equal to 20% on the FOTO, demonstrating overall improved functional ability with upper extremity. Not met       Plan   Conintue with occupational therapy POC to progress ROM, strength, pain management and restore pt to improved level of function with ADL's and IADL's.     Updates/Grading for next session: Finalize HEP       Brian Roche, OT

## 2018-12-12 ENCOUNTER — CLINICAL SUPPORT (OUTPATIENT)
Dept: REHABILITATION | Facility: HOSPITAL | Age: 63
End: 2018-12-12
Payer: MEDICAID

## 2018-12-12 DIAGNOSIS — M79.644 PAIN OF FINGER OF RIGHT HAND: ICD-10-CM

## 2018-12-12 PROCEDURE — 97140 MANUAL THERAPY 1/> REGIONS: CPT | Mod: PN

## 2018-12-12 PROCEDURE — 97035 APP MDLTY 1+ULTRASOUND EA 15: CPT | Mod: PN

## 2021-03-16 ENCOUNTER — OFFICE VISIT (OUTPATIENT)
Dept: UROLOGY | Facility: CLINIC | Age: 66
End: 2021-03-16
Payer: MEDICAID

## 2021-03-16 VITALS
SYSTOLIC BLOOD PRESSURE: 134 MMHG | HEIGHT: 69 IN | DIASTOLIC BLOOD PRESSURE: 80 MMHG | BODY MASS INDEX: 24.99 KG/M2 | WEIGHT: 168.75 LBS

## 2021-03-16 DIAGNOSIS — R35.1 NOCTURIA: ICD-10-CM

## 2021-03-16 DIAGNOSIS — N52.9 ERECTILE DYSFUNCTION OF ORGANIC ORIGIN: ICD-10-CM

## 2021-03-16 DIAGNOSIS — N13.8 BPH WITH OBSTRUCTION/LOWER URINARY TRACT SYMPTOMS: Primary | ICD-10-CM

## 2021-03-16 DIAGNOSIS — N40.1 BPH WITH OBSTRUCTION/LOWER URINARY TRACT SYMPTOMS: Primary | ICD-10-CM

## 2021-03-16 PROCEDURE — 99999 PR PBB SHADOW E&M-EST. PATIENT-LVL III: ICD-10-PCS | Mod: PBBFAC,,, | Performed by: NURSE PRACTITIONER

## 2021-03-16 PROCEDURE — 99213 OFFICE O/P EST LOW 20 MIN: CPT | Mod: PBBFAC | Performed by: NURSE PRACTITIONER

## 2021-03-16 PROCEDURE — 99204 PR OFFICE/OUTPT VISIT, NEW, LEVL IV, 45-59 MIN: ICD-10-PCS | Mod: S$PBB,,, | Performed by: NURSE PRACTITIONER

## 2021-03-16 PROCEDURE — 99999 PR PBB SHADOW E&M-EST. PATIENT-LVL III: CPT | Mod: PBBFAC,,, | Performed by: NURSE PRACTITIONER

## 2021-03-16 PROCEDURE — 81001 URINALYSIS AUTO W/SCOPE: CPT | Mod: PBBFAC | Performed by: NURSE PRACTITIONER

## 2021-03-16 PROCEDURE — 99204 OFFICE O/P NEW MOD 45 MIN: CPT | Mod: S$PBB,,, | Performed by: NURSE PRACTITIONER

## 2021-03-16 RX ORDER — TADALAFIL 10 MG/1
10 TABLET ORAL DAILY PRN
Qty: 30 TABLET | Refills: 11 | Status: SHIPPED | OUTPATIENT
Start: 2021-03-16 | End: 2021-03-16 | Stop reason: SDUPTHER

## 2021-03-16 RX ORDER — TADALAFIL 10 MG/1
10 TABLET ORAL DAILY PRN
Qty: 30 TABLET | Refills: 11 | Status: SHIPPED | OUTPATIENT
Start: 2021-03-16 | End: 2021-07-09 | Stop reason: SDUPTHER

## 2021-07-09 ENCOUNTER — OFFICE VISIT (OUTPATIENT)
Dept: UROLOGY | Facility: CLINIC | Age: 66
End: 2021-07-09
Payer: MEDICAID

## 2021-07-09 VITALS — WEIGHT: 166.31 LBS | HEIGHT: 69 IN | BODY MASS INDEX: 24.63 KG/M2

## 2021-07-09 DIAGNOSIS — R35.1 NOCTURIA: ICD-10-CM

## 2021-07-09 DIAGNOSIS — N40.1 BPH WITH OBSTRUCTION/LOWER URINARY TRACT SYMPTOMS: Primary | ICD-10-CM

## 2021-07-09 DIAGNOSIS — N52.9 ERECTILE DYSFUNCTION OF ORGANIC ORIGIN: ICD-10-CM

## 2021-07-09 DIAGNOSIS — N13.8 BPH WITH OBSTRUCTION/LOWER URINARY TRACT SYMPTOMS: Primary | ICD-10-CM

## 2021-07-09 PROCEDURE — 99213 OFFICE O/P EST LOW 20 MIN: CPT | Mod: PBBFAC | Performed by: NURSE PRACTITIONER

## 2021-07-09 PROCEDURE — 99214 PR OFFICE/OUTPT VISIT, EST, LEVL IV, 30-39 MIN: ICD-10-PCS | Mod: S$PBB,,, | Performed by: NURSE PRACTITIONER

## 2021-07-09 PROCEDURE — 99999 PR PBB SHADOW E&M-EST. PATIENT-LVL III: CPT | Mod: PBBFAC,,, | Performed by: NURSE PRACTITIONER

## 2021-07-09 PROCEDURE — 81001 URINALYSIS AUTO W/SCOPE: CPT | Mod: PBBFAC | Performed by: NURSE PRACTITIONER

## 2021-07-09 PROCEDURE — 99999 PR PBB SHADOW E&M-EST. PATIENT-LVL III: ICD-10-PCS | Mod: PBBFAC,,, | Performed by: NURSE PRACTITIONER

## 2021-07-09 PROCEDURE — 99214 OFFICE O/P EST MOD 30 MIN: CPT | Mod: S$PBB,,, | Performed by: NURSE PRACTITIONER

## 2021-07-09 RX ORDER — TADALAFIL 10 MG/1
10 TABLET ORAL DAILY PRN
Qty: 30 TABLET | Refills: 11 | Status: SHIPPED | OUTPATIENT
Start: 2021-07-09 | End: 2022-07-09

## 2021-12-01 ENCOUNTER — LAB VISIT (OUTPATIENT)
Dept: LAB | Facility: HOSPITAL | Age: 66
End: 2021-12-01
Attending: NURSE PRACTITIONER
Payer: MEDICAID

## 2021-12-01 DIAGNOSIS — N40.1 BPH WITH OBSTRUCTION/LOWER URINARY TRACT SYMPTOMS: ICD-10-CM

## 2021-12-01 DIAGNOSIS — N13.8 BPH WITH OBSTRUCTION/LOWER URINARY TRACT SYMPTOMS: ICD-10-CM

## 2021-12-01 LAB — COMPLEXED PSA SERPL-MCNC: 2.1 NG/ML (ref 0–4)

## 2021-12-01 PROCEDURE — 84153 ASSAY OF PSA TOTAL: CPT | Performed by: NURSE PRACTITIONER

## 2021-12-01 PROCEDURE — 36415 COLL VENOUS BLD VENIPUNCTURE: CPT | Performed by: NURSE PRACTITIONER

## 2021-12-09 ENCOUNTER — OFFICE VISIT (OUTPATIENT)
Dept: UROLOGY | Facility: CLINIC | Age: 66
End: 2021-12-09
Payer: MEDICAID

## 2021-12-09 VITALS — WEIGHT: 164.88 LBS | BODY MASS INDEX: 24.35 KG/M2

## 2021-12-09 DIAGNOSIS — R35.1 NOCTURIA: ICD-10-CM

## 2021-12-09 DIAGNOSIS — N13.8 BPH WITH OBSTRUCTION/LOWER URINARY TRACT SYMPTOMS: Primary | ICD-10-CM

## 2021-12-09 DIAGNOSIS — N52.9 ERECTILE DYSFUNCTION OF ORGANIC ORIGIN: ICD-10-CM

## 2021-12-09 DIAGNOSIS — N40.1 BPH WITH OBSTRUCTION/LOWER URINARY TRACT SYMPTOMS: Primary | ICD-10-CM

## 2021-12-09 LAB
BILIRUB SERPL-MCNC: NEGATIVE MG/DL
BLOOD URINE, POC: NEGATIVE
COLOR, POC UA: YELLOW
GLUCOSE UR QL STRIP: NORMAL
KETONES UR QL STRIP: NEGATIVE
LEUKOCYTE ESTERASE URINE, POC: NEGATIVE
NITRITE, POC UA: NEGATIVE
PH, POC UA: 5
PROTEIN, POC: NEGATIVE
SPECIFIC GRAVITY, POC UA: 1020
UROBILINOGEN, POC UA: NORMAL

## 2021-12-09 PROCEDURE — 81001 URINALYSIS AUTO W/SCOPE: CPT | Mod: PBBFAC | Performed by: NURSE PRACTITIONER

## 2021-12-09 PROCEDURE — 99214 PR OFFICE/OUTPT VISIT, EST, LEVL IV, 30-39 MIN: ICD-10-PCS | Mod: S$PBB,,, | Performed by: NURSE PRACTITIONER

## 2021-12-09 PROCEDURE — 99212 OFFICE O/P EST SF 10 MIN: CPT | Mod: PBBFAC | Performed by: NURSE PRACTITIONER

## 2021-12-09 PROCEDURE — 99999 PR PBB SHADOW E&M-EST. PATIENT-LVL II: ICD-10-PCS | Mod: PBBFAC,,, | Performed by: NURSE PRACTITIONER

## 2021-12-09 PROCEDURE — 99214 OFFICE O/P EST MOD 30 MIN: CPT | Mod: S$PBB,,, | Performed by: NURSE PRACTITIONER

## 2021-12-09 PROCEDURE — 99999 PR PBB SHADOW E&M-EST. PATIENT-LVL II: CPT | Mod: PBBFAC,,, | Performed by: NURSE PRACTITIONER

## 2022-03-09 ENCOUNTER — HOSPITAL ENCOUNTER (OUTPATIENT)
Dept: RADIOLOGY | Facility: HOSPITAL | Age: 67
Discharge: HOME | End: 2022-03-09
Attending: INTERNAL MEDICINE
Payer: MEDICAID

## 2022-03-09 DIAGNOSIS — Z12.2 ENCOUNTER FOR SCREENING FOR MALIGNANT NEOPLASM OF LUNG: ICD-10-CM

## 2022-03-09 PROCEDURE — 71271 CT THORAX LUNG CANCER SCR C-: CPT | Mod: TC

## 2022-03-09 PROCEDURE — 71271 CT THORAX LUNG CANCER SCR C-: CPT | Mod: 26,,, | Performed by: RADIOLOGY

## 2022-03-09 PROCEDURE — 71271 CT CHEST LUNG SCREENING LOW DOSE: ICD-10-PCS | Mod: 26,,, | Performed by: RADIOLOGY

## 2022-09-01 ENCOUNTER — OFFICE VISIT (OUTPATIENT)
Dept: UROLOGY | Facility: CLINIC | Age: 67
End: 2022-09-01
Payer: MEDICAID

## 2022-09-01 VITALS — WEIGHT: 165.88 LBS | BODY MASS INDEX: 24.5 KG/M2

## 2022-09-01 DIAGNOSIS — N40.1 BPH WITH OBSTRUCTION/LOWER URINARY TRACT SYMPTOMS: Primary | ICD-10-CM

## 2022-09-01 DIAGNOSIS — Z80.42 FAMILY HISTORY OF PROSTATE CANCER: ICD-10-CM

## 2022-09-01 DIAGNOSIS — N13.8 BPH WITH OBSTRUCTION/LOWER URINARY TRACT SYMPTOMS: Primary | ICD-10-CM

## 2022-09-01 PROCEDURE — 99213 PR OFFICE/OUTPT VISIT, EST, LEVL III, 20-29 MIN: ICD-10-PCS | Mod: S$PBB,,, | Performed by: STUDENT IN AN ORGANIZED HEALTH CARE EDUCATION/TRAINING PROGRAM

## 2022-09-01 PROCEDURE — 99999 PR PBB SHADOW E&M-EST. PATIENT-LVL II: CPT | Mod: PBBFAC,,, | Performed by: STUDENT IN AN ORGANIZED HEALTH CARE EDUCATION/TRAINING PROGRAM

## 2022-09-01 PROCEDURE — 4010F PR ACE/ARB THEARPY RXD/TAKEN: ICD-10-PCS | Mod: CPTII,,, | Performed by: STUDENT IN AN ORGANIZED HEALTH CARE EDUCATION/TRAINING PROGRAM

## 2022-09-01 PROCEDURE — 99213 OFFICE O/P EST LOW 20 MIN: CPT | Mod: S$PBB,,, | Performed by: STUDENT IN AN ORGANIZED HEALTH CARE EDUCATION/TRAINING PROGRAM

## 2022-09-01 PROCEDURE — 1160F RVW MEDS BY RX/DR IN RCRD: CPT | Mod: CPTII,,, | Performed by: STUDENT IN AN ORGANIZED HEALTH CARE EDUCATION/TRAINING PROGRAM

## 2022-09-01 PROCEDURE — 1101F PT FALLS ASSESS-DOCD LE1/YR: CPT | Mod: CPTII,,, | Performed by: STUDENT IN AN ORGANIZED HEALTH CARE EDUCATION/TRAINING PROGRAM

## 2022-09-01 PROCEDURE — 3288F PR FALLS RISK ASSESSMENT DOCUMENTED: ICD-10-PCS | Mod: CPTII,,, | Performed by: STUDENT IN AN ORGANIZED HEALTH CARE EDUCATION/TRAINING PROGRAM

## 2022-09-01 PROCEDURE — 3008F PR BODY MASS INDEX (BMI) DOCUMENTED: ICD-10-PCS | Mod: CPTII,,, | Performed by: STUDENT IN AN ORGANIZED HEALTH CARE EDUCATION/TRAINING PROGRAM

## 2022-09-01 PROCEDURE — 1101F PR PT FALLS ASSESS DOC 0-1 FALLS W/OUT INJ PAST YR: ICD-10-PCS | Mod: CPTII,,, | Performed by: STUDENT IN AN ORGANIZED HEALTH CARE EDUCATION/TRAINING PROGRAM

## 2022-09-01 PROCEDURE — 99212 OFFICE O/P EST SF 10 MIN: CPT | Mod: PBBFAC | Performed by: STUDENT IN AN ORGANIZED HEALTH CARE EDUCATION/TRAINING PROGRAM

## 2022-09-01 PROCEDURE — 3008F BODY MASS INDEX DOCD: CPT | Mod: CPTII,,, | Performed by: STUDENT IN AN ORGANIZED HEALTH CARE EDUCATION/TRAINING PROGRAM

## 2022-09-01 PROCEDURE — 1159F MED LIST DOCD IN RCRD: CPT | Mod: CPTII,,, | Performed by: STUDENT IN AN ORGANIZED HEALTH CARE EDUCATION/TRAINING PROGRAM

## 2022-09-01 PROCEDURE — 1159F PR MEDICATION LIST DOCUMENTED IN MEDICAL RECORD: ICD-10-PCS | Mod: CPTII,,, | Performed by: STUDENT IN AN ORGANIZED HEALTH CARE EDUCATION/TRAINING PROGRAM

## 2022-09-01 PROCEDURE — 4010F ACE/ARB THERAPY RXD/TAKEN: CPT | Mod: CPTII,,, | Performed by: STUDENT IN AN ORGANIZED HEALTH CARE EDUCATION/TRAINING PROGRAM

## 2022-09-01 PROCEDURE — 3288F FALL RISK ASSESSMENT DOCD: CPT | Mod: CPTII,,, | Performed by: STUDENT IN AN ORGANIZED HEALTH CARE EDUCATION/TRAINING PROGRAM

## 2022-09-01 PROCEDURE — 1160F PR REVIEW ALL MEDS BY PRESCRIBER/CLIN PHARMACIST DOCUMENTED: ICD-10-PCS | Mod: CPTII,,, | Performed by: STUDENT IN AN ORGANIZED HEALTH CARE EDUCATION/TRAINING PROGRAM

## 2022-09-01 PROCEDURE — 99999 PR PBB SHADOW E&M-EST. PATIENT-LVL II: ICD-10-PCS | Mod: PBBFAC,,, | Performed by: STUDENT IN AN ORGANIZED HEALTH CARE EDUCATION/TRAINING PROGRAM

## 2022-09-01 RX ORDER — TADALAFIL 10 MG/1
TABLET ORAL
COMMUNITY
End: 2023-03-03 | Stop reason: SDUPTHER

## 2022-09-01 NOTE — PROGRESS NOTES
Patient ID: Prasanna Scott is a 67 y.o. male.    Chief Complaint: Follow-up      HPI  67 y.o. who presents to the Urology clinic for evaluation of LUTS. Patient currently voiding well without complaints. He has brother who was treated for prostate cancer with brachytherapy. He is due for PSA, not yet obtained. Denies unexplained weight loss, hematuria, dysuria, flank pain, SOB, CP.   Denies bothersome urgency, frequency  Taking tadalafil for ED, Rx per PCP    Medically Necessary ROS documented in HPI    Past Medical History  Active Ambulatory Problems     Diagnosis Date Noted    Finger pain 09/12/2018    Primary osteoarthritis of first carpometacarpal joint of right hand 11/21/2018     Resolved Ambulatory Problems     Diagnosis Date Noted    No Resolved Ambulatory Problems     Past Medical History:   Diagnosis Date    Arthritis          Past Surgical History  Past Surgical History:   Procedure Laterality Date    arm surgury      CARPAL TUNNEL RELEASE      neck surgury      TIBIA FRACTURE SURGERY      ULNAR NERVE TRANSPOSITION         Social History  Social Connections: Not on file       Medications    Current Outpatient Medications:     gabapentin (NEURONTIN) 250 mg/5 mL solution, Take by mouth 3 (three) times daily., Disp: , Rfl:     hydrocodone-acetaminophen 5-325mg (NORCO) 5-325 mg per tablet, Take 1 tablet by mouth every 6 (six) hours as needed for Pain., Disp: , Rfl:     tadalafiL (CIALIS) 10 MG tablet, Cialis 10 mg tablet  Take 1 tablet every day by oral route as needed for 30 days., Disp: , Rfl:     Allergies  Review of patient's allergies indicates:  No Known Allergies    Patient's PMH, FH, Social hx, Medications, allergies reviewed and updated as pertinent to today's visit    Objective:      Physical Exam  Constitutional:       General: He is not in acute distress.     Appearance: He is well-developed. He is not ill-appearing, toxic-appearing or diaphoretic.   HENT:      Head: Normocephalic and  atraumatic.      Mouth/Throat:      Mouth: Mucous membranes are moist.   Eyes:      Conjunctiva/sclera: Conjunctivae normal.   Pulmonary:      Effort: Pulmonary effort is normal. No respiratory distress.   Abdominal:      General: Abdomen is flat. There is no distension.      Palpations: Abdomen is soft. There is no mass.      Tenderness: There is no abdominal tenderness. There is no right CVA tenderness, left CVA tenderness or guarding.   Musculoskeletal:         General: No swelling or deformity.   Skin:     General: Skin is warm.      Findings: No rash.   Neurological:      Mental Status: He is alert and oriented to person, place, and time.      Gait: Gait normal.   Psychiatric:         Mood and Affect: Mood normal.         Thought Content: Thought content normal.         Judgment: Judgment normal.           Lab Results   Component Value Date    PSADIAG 2.1 12/01/2021      Assessment:       1. BPH with obstruction/lower urinary tract symptoms    2. Family history of prostate cancer          Plan:         PSA due    RTC 6 months for PSA check vs resuming PSA checks with PCP until PSA reaches abnormal range or demonstrates worrisome velocity

## 2023-03-02 ENCOUNTER — LAB VISIT (OUTPATIENT)
Dept: LAB | Facility: HOSPITAL | Age: 68
End: 2023-03-02
Attending: STUDENT IN AN ORGANIZED HEALTH CARE EDUCATION/TRAINING PROGRAM
Payer: MEDICAID

## 2023-03-02 DIAGNOSIS — N40.1 BPH WITH OBSTRUCTION/LOWER URINARY TRACT SYMPTOMS: ICD-10-CM

## 2023-03-02 DIAGNOSIS — N13.8 BPH WITH OBSTRUCTION/LOWER URINARY TRACT SYMPTOMS: ICD-10-CM

## 2023-03-02 LAB — COMPLEXED PSA SERPL-MCNC: 2.7 NG/ML (ref 0–4)

## 2023-03-02 PROCEDURE — 84153 ASSAY OF PSA TOTAL: CPT | Performed by: STUDENT IN AN ORGANIZED HEALTH CARE EDUCATION/TRAINING PROGRAM

## 2023-03-02 PROCEDURE — 36415 COLL VENOUS BLD VENIPUNCTURE: CPT | Performed by: STUDENT IN AN ORGANIZED HEALTH CARE EDUCATION/TRAINING PROGRAM

## 2023-03-03 ENCOUNTER — OFFICE VISIT (OUTPATIENT)
Dept: UROLOGY | Facility: CLINIC | Age: 68
End: 2023-03-03
Payer: MEDICAID

## 2023-03-03 VITALS — WEIGHT: 159.19 LBS | BODY MASS INDEX: 23.51 KG/M2

## 2023-03-03 DIAGNOSIS — N52.9 ERECTILE DYSFUNCTION OF ORGANIC ORIGIN: ICD-10-CM

## 2023-03-03 DIAGNOSIS — R35.1 NOCTURIA: ICD-10-CM

## 2023-03-03 DIAGNOSIS — N13.8 BPH WITH OBSTRUCTION/LOWER URINARY TRACT SYMPTOMS: Primary | ICD-10-CM

## 2023-03-03 DIAGNOSIS — N40.1 BPH WITH OBSTRUCTION/LOWER URINARY TRACT SYMPTOMS: Primary | ICD-10-CM

## 2023-03-03 LAB
BILIRUB SERPL-MCNC: NEGATIVE MG/DL
BLOOD URINE, POC: NEGATIVE
COLOR, POC UA: YELLOW
GLUCOSE UR QL STRIP: NORMAL
KETONES UR QL STRIP: NEGATIVE
LEUKOCYTE ESTERASE URINE, POC: NEGATIVE
NITRITE, POC UA: NEGATIVE
PH, POC UA: 7
PROTEIN, POC: NEGATIVE
SPECIFIC GRAVITY, POC UA: 1010
UROBILINOGEN, POC UA: NORMAL

## 2023-03-03 PROCEDURE — 1160F RVW MEDS BY RX/DR IN RCRD: CPT | Mod: CPTII,,, | Performed by: UROLOGY

## 2023-03-03 PROCEDURE — 1159F PR MEDICATION LIST DOCUMENTED IN MEDICAL RECORD: ICD-10-PCS | Mod: CPTII,,, | Performed by: UROLOGY

## 2023-03-03 PROCEDURE — 1101F PT FALLS ASSESS-DOCD LE1/YR: CPT | Mod: CPTII,,, | Performed by: UROLOGY

## 2023-03-03 PROCEDURE — 3288F PR FALLS RISK ASSESSMENT DOCUMENTED: ICD-10-PCS | Mod: CPTII,,, | Performed by: UROLOGY

## 2023-03-03 PROCEDURE — 99999 PR PBB SHADOW E&M-EST. PATIENT-LVL III: CPT | Mod: PBBFAC,,, | Performed by: UROLOGY

## 2023-03-03 PROCEDURE — 99214 OFFICE O/P EST MOD 30 MIN: CPT | Mod: S$PBB,,, | Performed by: UROLOGY

## 2023-03-03 PROCEDURE — 4010F PR ACE/ARB THEARPY RXD/TAKEN: ICD-10-PCS | Mod: CPTII,,, | Performed by: UROLOGY

## 2023-03-03 PROCEDURE — 81001 URINALYSIS AUTO W/SCOPE: CPT | Mod: PBBFAC | Performed by: UROLOGY

## 2023-03-03 PROCEDURE — 99999 PR PBB SHADOW E&M-EST. PATIENT-LVL III: ICD-10-PCS | Mod: PBBFAC,,, | Performed by: UROLOGY

## 2023-03-03 PROCEDURE — 3008F PR BODY MASS INDEX (BMI) DOCUMENTED: ICD-10-PCS | Mod: CPTII,,, | Performed by: UROLOGY

## 2023-03-03 PROCEDURE — 1160F PR REVIEW ALL MEDS BY PRESCRIBER/CLIN PHARMACIST DOCUMENTED: ICD-10-PCS | Mod: CPTII,,, | Performed by: UROLOGY

## 2023-03-03 PROCEDURE — 1125F AMNT PAIN NOTED PAIN PRSNT: CPT | Mod: CPTII,,, | Performed by: UROLOGY

## 2023-03-03 PROCEDURE — 1125F PR PAIN SEVERITY QUANTIFIED, PAIN PRESENT: ICD-10-PCS | Mod: CPTII,,, | Performed by: UROLOGY

## 2023-03-03 PROCEDURE — 99214 PR OFFICE/OUTPT VISIT, EST, LEVL IV, 30-39 MIN: ICD-10-PCS | Mod: S$PBB,,, | Performed by: UROLOGY

## 2023-03-03 PROCEDURE — 4010F ACE/ARB THERAPY RXD/TAKEN: CPT | Mod: CPTII,,, | Performed by: UROLOGY

## 2023-03-03 PROCEDURE — 1159F MED LIST DOCD IN RCRD: CPT | Mod: CPTII,,, | Performed by: UROLOGY

## 2023-03-03 PROCEDURE — 1101F PR PT FALLS ASSESS DOC 0-1 FALLS W/OUT INJ PAST YR: ICD-10-PCS | Mod: CPTII,,, | Performed by: UROLOGY

## 2023-03-03 PROCEDURE — 3008F BODY MASS INDEX DOCD: CPT | Mod: CPTII,,, | Performed by: UROLOGY

## 2023-03-03 PROCEDURE — 3288F FALL RISK ASSESSMENT DOCD: CPT | Mod: CPTII,,, | Performed by: UROLOGY

## 2023-03-03 PROCEDURE — 99213 OFFICE O/P EST LOW 20 MIN: CPT | Mod: PBBFAC | Performed by: UROLOGY

## 2023-03-03 RX ORDER — OLMESARTAN MEDOXOMIL 20 MG/1
20 TABLET ORAL
COMMUNITY
Start: 2023-02-06 | End: 2024-02-27

## 2023-03-03 RX ORDER — TADALAFIL 10 MG/1
TABLET ORAL
Qty: 30 TABLET | Refills: 11 | Status: ON HOLD | OUTPATIENT
Start: 2023-03-03 | End: 2024-02-28 | Stop reason: HOSPADM

## 2023-03-03 RX ORDER — AMLODIPINE BESYLATE 10 MG/1
TABLET ORAL
Status: ON HOLD | COMMUNITY
End: 2024-02-28 | Stop reason: HOSPADM

## 2023-03-03 RX ORDER — METOPROLOL TARTRATE 25 MG/1
TABLET, FILM COATED ORAL
COMMUNITY

## 2023-03-03 RX ORDER — TADALAFIL 10 MG/1
TABLET ORAL
Qty: 30 TABLET | Refills: 11 | Status: SHIPPED | OUTPATIENT
Start: 2023-03-03 | End: 2023-03-03 | Stop reason: SDUPTHER

## 2023-03-03 NOTE — PROGRESS NOTES
Subjective:       Patient ID: Prasanna Scott is a 67 y.o. male The patient's last visit with me was on Visit date not found.   He saw Dr. Oliveira on 9/1/2022.    Chief Complaint:   Chief Complaint   Patient presents with    Follow-up     67 y.o. who presents to the Urology clinic for evaluation of LUTS. Patient currently voiding well without complaints. He has brother who was treated for prostate cancer with brachytherapy. He is due for PSA, not yet obtained. Denies unexplained weight loss, hematuria, dysuria, flank pain, SOB, CP.   Denies bothersome urgency, frequency  Taking tadalafil for ED, Rx per PCP    Benign Prostatic Hyperplasia  He patient reports intermittency and nocturia two times a night. He denies straining. The patient states symptoms are of mild severity. Onset of symptoms was several years ago and was gradual in onset.  He has no personal history and a family history of prostate cancer. He reports a history of no complicating symptoms. He denies flank pain, gross hematuria, kidney stones, and recurrent UTI.  He is currently taking no prostate medication.     ACTIVE MEDICAL ISSUES:  Patient Active Problem List   Diagnosis    Finger pain    Primary osteoarthritis of first carpometacarpal joint of right hand       ALLERGIES AND MEDICATIONS: updated and reviewed.  Review of patient's allergies indicates:  No Known Allergies  Current Outpatient Medications   Medication Sig    amLODIPine (NORVASC) 10 MG tablet amlodipine 10 mg tablet   TAKE 1 TABLET BY MOUTH EVERY DAY    gabapentin (NEURONTIN) 250 mg/5 mL solution Take by mouth 3 (three) times daily.    hydrocodone-acetaminophen 5-325mg (NORCO) 5-325 mg per tablet Take 1 tablet by mouth every 6 (six) hours as needed for Pain.    metoprolol tartrate (LOPRESSOR) 25 MG tablet metoprolol tartrate 25 mg tablet   TAKE 1 TABLET BY MOUTH TWICE DAILY    olmesartan (BENICAR) 20 MG tablet Take 20 mg by mouth.    tadalafiL (CIALIS) 10 MG tablet Cialis 10 mg  tablet   Take 1 tablet every day by oral route as needed for 30 days.     No current facility-administered medications for this visit.       Review of Systems   Constitutional:  Negative for chills and fever.   HENT:  Negative for congestion.    Respiratory:  Negative for chest tightness and shortness of breath.    Cardiovascular:  Negative for chest pain and palpitations.   Gastrointestinal:  Negative for abdominal pain, constipation, diarrhea, nausea and vomiting.   Genitourinary:  Negative for difficulty urinating, dysuria, flank pain, hematuria and urgency.   Musculoskeletal:  Negative for arthralgias.   Neurological:  Negative for dizziness.   Psychiatric/Behavioral:  Negative for confusion.      Objective:      Vitals:    03/03/23 1311   Weight: 72.2 kg (159 lb 2.8 oz)     Physical Exam  Vitals and nursing note reviewed.   Constitutional:       Appearance: He is well-developed.   HENT:      Head: Normocephalic.   Eyes:      Conjunctiva/sclera: Conjunctivae normal.   Neck:      Thyroid: No thyromegaly.      Trachea: No tracheal deviation.   Cardiovascular:      Rate and Rhythm: Normal rate.      Heart sounds: Normal heart sounds.   Pulmonary:      Effort: Pulmonary effort is normal. No respiratory distress.      Breath sounds: Normal breath sounds. No wheezing.   Abdominal:      General: Bowel sounds are normal.      Palpations: Abdomen is soft.      Tenderness: There is no abdominal tenderness. There is no rebound.      Hernia: No hernia is present.   Musculoskeletal:         General: No tenderness. Normal range of motion.      Cervical back: Normal range of motion and neck supple.   Lymphadenopathy:      Cervical: No cervical adenopathy.   Skin:     General: Skin is warm and dry.      Findings: No erythema or rash.   Neurological:      Mental Status: He is alert and oriented to person, place, and time.   Psychiatric:         Behavior: Behavior normal.         Thought Content: Thought content normal.          Judgment: Judgment normal.       Urine dipstick shows negative for all components.  Micro exam: negative for WBC's or RBC's.    Component Ref Range & Units 1 d ago 1 yr ago   PSA Diagnostic 0.00 - 4.00 ng/mL 2.7  2.1 CM    Comment: The testing method is a chemiluminescent microparticle immunoassay   manufactured by [employer] and performed on the    or   micecloud system. Values obtained with different assay manufacturers   for   methods may be different and cannot be used interchangeably.   PSA Expected levels:   Hormonal Therapy: <0.05 ng/ml   Prostatectomy: <0.01 ng/ml   Radiation Therapy: <1.00 ng/ml    Resulting Agency  [facility]              Specimen Collected: 03/02/23 11:03 Last Resulted: 03/02/23 21:21             Assessment:       1. BPH with obstruction/lower urinary tract symptoms    2. Nocturia    3. Erectile dysfunction of organic origin          Plan:       1. BPH with obstruction/lower urinary tract symptoms    - POCT urinalysis, dipstick or tablet reag  - Prostate Specific Antigen, Diagnostic; Future    2. Nocturia      3. Erectile dysfunction of organic origin    - tadalafiL (CIALIS) 10 MG tablet; Cialis 10 mg tablet   Take 1 tablet every day by oral route as needed for 30 days.  Dispense: 30 tablet; Refill: 11            Follow up in about 6 months (around 9/3/2023) for Follow up Established, Review PSA.

## 2023-03-06 ENCOUNTER — TELEPHONE (OUTPATIENT)
Dept: UROLOGY | Facility: CLINIC | Age: 68
End: 2023-03-06
Payer: MEDICAID

## 2023-03-06 NOTE — TELEPHONE ENCOUNTER
complete prescription refill per RN Medication Refill Policy.................... Miriam Sinclair RN ....................  2017   12:51 PM                 Pt notified of PSA results & to f/u w/Dr. Hernandez

## 2023-03-06 NOTE — TELEPHONE ENCOUNTER
----- Message from Katie Oliveira MD sent at 3/6/2023  1:16 PM CST -----  Please alert patient his PSA is within normal limits 2.7 from 2.1 last year, he has a follow up appt with Dr. Hernandez

## 2023-03-06 NOTE — PROGRESS NOTES
Please alert patient his PSA is within normal limits 2.7 from 2.1 last year, he has a follow up appt with Dr. Hernandez

## 2024-02-26 ENCOUNTER — HOSPITAL ENCOUNTER (INPATIENT)
Facility: HOSPITAL | Age: 69
LOS: 1 days | Discharge: HOME | DRG: 918 | End: 2024-02-28
Attending: EMERGENCY MEDICINE | Admitting: STUDENT IN AN ORGANIZED HEALTH CARE EDUCATION/TRAINING PROGRAM
Payer: COMMERCIAL

## 2024-02-26 DIAGNOSIS — E87.1 HYPONATREMIA: ICD-10-CM

## 2024-02-26 DIAGNOSIS — R29.818 ACUTE FOCAL NEUROLOGICAL DEFICIT: ICD-10-CM

## 2024-02-26 DIAGNOSIS — R55 SYNCOPE, UNSPECIFIED SYNCOPE TYPE: ICD-10-CM

## 2024-02-26 DIAGNOSIS — R29.898 RIGHT ARM WEAKNESS: ICD-10-CM

## 2024-02-26 DIAGNOSIS — R41.82 ALTERED MENTAL STATUS: Primary | ICD-10-CM

## 2024-02-26 DIAGNOSIS — R29.90 STROKE-LIKE SYMPTOMS: ICD-10-CM

## 2024-02-26 DIAGNOSIS — G45.9 TIA (TRANSIENT ISCHEMIC ATTACK): ICD-10-CM

## 2024-02-26 LAB
ALBUMIN SERPL BCP-MCNC: 3.6 G/DL (ref 3.5–5.2)
ALP SERPL-CCNC: 46 U/L (ref 55–135)
ALT SERPL W/O P-5'-P-CCNC: 23 U/L (ref 10–44)
ANION GAP SERPL CALC-SCNC: 7 MMOL/L (ref 8–16)
AST SERPL-CCNC: 25 U/L (ref 10–40)
BASOPHILS # BLD AUTO: 0.03 K/UL (ref 0–0.2)
BASOPHILS NFR BLD: 0.3 % (ref 0–1.9)
BILIRUB SERPL-MCNC: 0.3 MG/DL (ref 0.1–1)
BNP SERPL-MCNC: 19 PG/ML (ref 0–99)
BUN SERPL-MCNC: 14 MG/DL (ref 8–23)
CALCIUM SERPL-MCNC: 8.2 MG/DL (ref 8.7–10.5)
CHLORIDE SERPL-SCNC: 99 MMOL/L (ref 95–110)
CHOLEST SERPL-MCNC: 128 MG/DL (ref 120–199)
CHOLEST/HDLC SERPL: 3.1 {RATIO} (ref 2–5)
CO2 SERPL-SCNC: 24 MMOL/L (ref 23–29)
CREAT SERPL-MCNC: 1.2 MG/DL (ref 0.5–1.4)
DIFFERENTIAL METHOD BLD: ABNORMAL
EOSINOPHIL # BLD AUTO: 0.2 K/UL (ref 0–0.5)
EOSINOPHIL NFR BLD: 1.4 % (ref 0–8)
ERYTHROCYTE [DISTWIDTH] IN BLOOD BY AUTOMATED COUNT: 11.9 % (ref 11.5–14.5)
EST. GFR  (NO RACE VARIABLE): >60 ML/MIN/1.73 M^2
ETHANOL SERPL-MCNC: <10 MG/DL
GLUCOSE SERPL-MCNC: 107 MG/DL (ref 70–110)
HCT VFR BLD AUTO: 32.5 % (ref 40–54)
HDLC SERPL-MCNC: 41 MG/DL (ref 40–75)
HDLC SERPL: 32 % (ref 20–50)
HGB BLD-MCNC: 11.1 G/DL (ref 14–18)
HIV1+2 IGG SERPL QL IA.RAPID: NORMAL
IMM GRANULOCYTES # BLD AUTO: 0.04 K/UL (ref 0–0.04)
IMM GRANULOCYTES NFR BLD AUTO: 0.4 % (ref 0–0.5)
INR PPP: 1 (ref 0.8–1.2)
LDLC SERPL CALC-MCNC: 53.8 MG/DL (ref 63–159)
LYMPHOCYTES # BLD AUTO: 1.3 K/UL (ref 1–4.8)
LYMPHOCYTES NFR BLD: 12 % (ref 18–48)
MCH RBC QN AUTO: 32.4 PG (ref 27–31)
MCHC RBC AUTO-ENTMCNC: 34.2 G/DL (ref 32–36)
MCV RBC AUTO: 95 FL (ref 82–98)
MONOCYTES # BLD AUTO: 0.9 K/UL (ref 0.3–1)
MONOCYTES NFR BLD: 8.5 % (ref 4–15)
NEUTROPHILS # BLD AUTO: 8.6 K/UL (ref 1.8–7.7)
NEUTROPHILS NFR BLD: 77.4 % (ref 38–73)
NONHDLC SERPL-MCNC: 87 MG/DL
NRBC BLD-RTO: 0 /100 WBC
PLATELET # BLD AUTO: 236 K/UL (ref 150–450)
PMV BLD AUTO: 9.1 FL (ref 9.2–12.9)
POTASSIUM SERPL-SCNC: 4.6 MMOL/L (ref 3.5–5.1)
PROT SERPL-MCNC: 6.2 G/DL (ref 6–8.4)
PROTHROMBIN TIME: 10.8 SEC (ref 9–12.5)
RBC # BLD AUTO: 3.43 M/UL (ref 4.6–6.2)
SODIUM SERPL-SCNC: 130 MMOL/L (ref 136–145)
TRIGL SERPL-MCNC: 166 MG/DL (ref 30–150)
TROPONIN I SERPL DL<=0.01 NG/ML-MCNC: <0.006 NG/ML (ref 0–0.03)
TSH SERPL DL<=0.005 MIU/L-ACNC: 1.98 UIU/ML (ref 0.4–4)
WBC # BLD AUTO: 11.12 K/UL (ref 3.9–12.7)

## 2024-02-26 PROCEDURE — 93005 ELECTROCARDIOGRAM TRACING: CPT

## 2024-02-26 PROCEDURE — 99285 EMERGENCY DEPT VISIT HI MDM: CPT | Mod: 25

## 2024-02-26 PROCEDURE — 83880 ASSAY OF NATRIURETIC PEPTIDE: CPT | Performed by: EMERGENCY MEDICINE

## 2024-02-26 PROCEDURE — 96360 HYDRATION IV INFUSION INIT: CPT

## 2024-02-26 PROCEDURE — 93010 ELECTROCARDIOGRAM REPORT: CPT | Mod: ,,, | Performed by: INTERNAL MEDICINE

## 2024-02-26 PROCEDURE — 25000003 PHARM REV CODE 250: Performed by: EMERGENCY MEDICINE

## 2024-02-26 PROCEDURE — 86703 HIV-1/HIV-2 1 RESULT ANTBDY: CPT | Performed by: EMERGENCY MEDICINE

## 2024-02-26 PROCEDURE — 85025 COMPLETE CBC W/AUTO DIFF WBC: CPT | Performed by: EMERGENCY MEDICINE

## 2024-02-26 PROCEDURE — 85610 PROTHROMBIN TIME: CPT | Performed by: EMERGENCY MEDICINE

## 2024-02-26 PROCEDURE — 84484 ASSAY OF TROPONIN QUANT: CPT | Performed by: EMERGENCY MEDICINE

## 2024-02-26 PROCEDURE — 84443 ASSAY THYROID STIM HORMONE: CPT | Performed by: EMERGENCY MEDICINE

## 2024-02-26 PROCEDURE — 82077 ASSAY SPEC XCP UR&BREATH IA: CPT | Performed by: EMERGENCY MEDICINE

## 2024-02-26 PROCEDURE — 80053 COMPREHEN METABOLIC PANEL: CPT | Performed by: EMERGENCY MEDICINE

## 2024-02-26 PROCEDURE — 86803 HEPATITIS C AB TEST: CPT | Performed by: EMERGENCY MEDICINE

## 2024-02-26 PROCEDURE — 80061 LIPID PANEL: CPT | Performed by: EMERGENCY MEDICINE

## 2024-02-26 PROCEDURE — 82962 GLUCOSE BLOOD TEST: CPT

## 2024-02-26 RX ORDER — LABETALOL HYDROCHLORIDE 5 MG/ML
10 INJECTION, SOLUTION INTRAVENOUS
Status: DISCONTINUED | OUTPATIENT
Start: 2024-02-27 | End: 2024-02-28 | Stop reason: HOSPADM

## 2024-02-26 RX ORDER — NAPROXEN SODIUM 220 MG/1
81 TABLET, FILM COATED ORAL DAILY
Status: DISCONTINUED | OUTPATIENT
Start: 2024-02-27 | End: 2024-02-27

## 2024-02-26 RX ORDER — ASPIRIN 325 MG
325 TABLET ORAL
Status: COMPLETED | OUTPATIENT
Start: 2024-02-26 | End: 2024-02-27

## 2024-02-26 RX ORDER — SODIUM CHLORIDE 0.9 % (FLUSH) 0.9 %
10 SYRINGE (ML) INJECTION
Status: DISCONTINUED | OUTPATIENT
Start: 2024-02-27 | End: 2024-02-28 | Stop reason: HOSPADM

## 2024-02-26 RX ORDER — BISACODYL 10 MG/1
10 SUPPOSITORY RECTAL DAILY PRN
Status: DISCONTINUED | OUTPATIENT
Start: 2024-02-27 | End: 2024-02-28 | Stop reason: HOSPADM

## 2024-02-26 RX ORDER — ONDANSETRON HYDROCHLORIDE 2 MG/ML
4 INJECTION, SOLUTION INTRAVENOUS EVERY 12 HOURS PRN
Status: DISCONTINUED | OUTPATIENT
Start: 2024-02-27 | End: 2024-02-28 | Stop reason: HOSPADM

## 2024-02-26 RX ORDER — AMOXICILLIN 250 MG
1 CAPSULE ORAL DAILY PRN
Status: DISCONTINUED | OUTPATIENT
Start: 2024-02-27 | End: 2024-02-28 | Stop reason: HOSPADM

## 2024-02-26 RX ORDER — ATORVASTATIN CALCIUM 10 MG/1
40 TABLET, FILM COATED ORAL DAILY
Status: DISCONTINUED | OUTPATIENT
Start: 2024-02-27 | End: 2024-02-27

## 2024-02-26 RX ORDER — ERGOCALCIFEROL 1.25 MG/1
1 CAPSULE ORAL
COMMUNITY

## 2024-02-26 RX ADMIN — SODIUM CHLORIDE 1000 ML: 9 INJECTION, SOLUTION INTRAVENOUS at 10:02

## 2024-02-26 NOTE — Clinical Note
Diagnosis: TIA (transient ischemic attack) [818343]   Future Attending Provider: DEDRA SENIOR [[id number]]   Special Needs:: No Special Needs [1]

## 2024-02-27 ENCOUNTER — PATIENT OUTREACH (OUTPATIENT)
Dept: ADMINISTRATIVE | Facility: OTHER | Age: 69
End: 2024-02-27
Payer: COMMERCIAL

## 2024-02-27 PROBLEM — T40.2X1A OPIOID OVERDOSE: Status: ACTIVE | Noted: 2024-02-27

## 2024-02-27 PROBLEM — R20.0 ARM NUMBNESS: Status: ACTIVE | Noted: 2024-02-27

## 2024-02-27 PROBLEM — I10 HYPERTENSION: Status: ACTIVE | Noted: 2024-02-27

## 2024-02-27 LAB
ALBUMIN SERPL BCP-MCNC: 3.2 G/DL (ref 3.5–5.2)
ALP SERPL-CCNC: 41 U/L (ref 55–135)
ALT SERPL W/O P-5'-P-CCNC: 21 U/L (ref 10–44)
AMPHET+METHAMPHET UR QL: NEGATIVE
ANION GAP SERPL CALC-SCNC: 4 MMOL/L (ref 8–16)
ASCENDING AORTA: 3.66 CM
AST SERPL-CCNC: 24 U/L (ref 10–40)
AV INDEX (PROSTH): 0.9
AV MEAN GRADIENT: 3 MMHG
AV PEAK GRADIENT: 5 MMHG
AV VALVE AREA BY VELOCITY RATIO: 3.76 CM²
AV VALVE AREA: 3.62 CM²
AV VELOCITY RATIO: 0.93
BARBITURATES UR QL SCN>200 NG/ML: NEGATIVE
BASOPHILS # BLD AUTO: 0.02 K/UL (ref 0–0.2)
BASOPHILS NFR BLD: 0.2 % (ref 0–1.9)
BENZODIAZ UR QL SCN>200 NG/ML: NEGATIVE
BILIRUB SERPL-MCNC: 0.3 MG/DL (ref 0.1–1)
BILIRUB UR QL STRIP: NEGATIVE
BSA FOR ECHO PROCEDURE: 1.95 M2
BUN SERPL-MCNC: 12 MG/DL (ref 8–23)
BZE UR QL SCN: NEGATIVE
CALCIUM SERPL-MCNC: 8.3 MG/DL (ref 8.7–10.5)
CANNABINOIDS UR QL SCN: ABNORMAL
CHLORIDE SERPL-SCNC: 102 MMOL/L (ref 95–110)
CLARITY UR: CLEAR
CO2 SERPL-SCNC: 24 MMOL/L (ref 23–29)
COLOR UR: YELLOW
CREAT SERPL-MCNC: 0.8 MG/DL (ref 0.5–1.4)
CREAT UR-MCNC: 103 MG/DL (ref 23–375)
CV ECHO LV RWT: 0.36 CM
DIFFERENTIAL METHOD BLD: ABNORMAL
DOP CALC AO PEAK VEL: 1.14 M/S
DOP CALC AO VTI: 28.6 CM
DOP CALC LVOT AREA: 4 CM2
DOP CALC LVOT DIAMETER: 2.27 CM
DOP CALC LVOT PEAK VEL: 1.06 M/S
DOP CALC LVOT STROKE VOLUME: 103.55 CM3
DOP CALCLVOT PEAK VEL VTI: 25.6 CM
E WAVE DECELERATION TIME: 176.84 MSEC
E/A RATIO: 1.19
E/E' RATIO: 8.42 M/S
ECHO LV POSTERIOR WALL: 0.82 CM (ref 0.6–1.1)
EOSINOPHIL # BLD AUTO: 0.1 K/UL (ref 0–0.5)
EOSINOPHIL NFR BLD: 1.6 % (ref 0–8)
ERYTHROCYTE [DISTWIDTH] IN BLOOD BY AUTOMATED COUNT: 11.9 % (ref 11.5–14.5)
EST. GFR  (NO RACE VARIABLE): >60 ML/MIN/1.73 M^2
ESTIMATED AVG GLUCOSE: 117 MG/DL (ref 68–131)
FRACTIONAL SHORTENING: 40 % (ref 28–44)
GLUCOSE SERPL-MCNC: 89 MG/DL (ref 70–110)
GLUCOSE UR QL STRIP: NEGATIVE
HBA1C MFR BLD: 5.7 % (ref 4–5.6)
HCT VFR BLD AUTO: 31 % (ref 40–54)
HCV AB SERPL QL IA: NORMAL
HGB BLD-MCNC: 10.5 G/DL (ref 14–18)
HGB UR QL STRIP: NEGATIVE
IMM GRANULOCYTES # BLD AUTO: 0.02 K/UL (ref 0–0.04)
IMM GRANULOCYTES NFR BLD AUTO: 0.2 % (ref 0–0.5)
INTERVENTRICULAR SEPTUM: 0.75 CM (ref 0.6–1.1)
IVC DIAMETER: 1.88 CM
IVRT: 88.49 MSEC
KETONES UR QL STRIP: NEGATIVE
LA MAJOR: 4.39 CM
LA MINOR: 4.86 CM
LA WIDTH: 3.7 CM
LEFT ATRIUM SIZE: 3.27 CM
LEFT ATRIUM VOLUME INDEX: 24.2 ML/M2
LEFT ATRIUM VOLUME: 47.44 CM3
LEFT INTERNAL DIMENSION IN SYSTOLE: 2.73 CM (ref 2.1–4)
LEFT VENTRICLE DIASTOLIC VOLUME INDEX: 47.69 ML/M2
LEFT VENTRICLE DIASTOLIC VOLUME: 93.48 ML
LEFT VENTRICLE MASS INDEX: 57 G/M2
LEFT VENTRICLE SYSTOLIC VOLUME INDEX: 14.2 ML/M2
LEFT VENTRICLE SYSTOLIC VOLUME: 27.76 ML
LEFT VENTRICULAR INTERNAL DIMENSION IN DIASTOLE: 4.52 CM (ref 3.5–6)
LEFT VENTRICULAR MASS: 111.69 G
LEUKOCYTE ESTERASE UR QL STRIP: NEGATIVE
LV LATERAL E/E' RATIO: 7.21 M/S
LV SEPTAL E/E' RATIO: 10.1 M/S
LVOT MG: 2.31 MMHG
LVOT MV: 0.69 CM/S
LYMPHOCYTES # BLD AUTO: 2 K/UL (ref 1–4.8)
LYMPHOCYTES NFR BLD: 24.4 % (ref 18–48)
MAGNESIUM SERPL-MCNC: 1.8 MG/DL (ref 1.6–2.6)
MCH RBC QN AUTO: 31.6 PG (ref 27–31)
MCHC RBC AUTO-ENTMCNC: 33.9 G/DL (ref 32–36)
MCV RBC AUTO: 93 FL (ref 82–98)
METHADONE UR QL SCN>300 NG/ML: NEGATIVE
MONOCYTES # BLD AUTO: 1 K/UL (ref 0.3–1)
MONOCYTES NFR BLD: 12.3 % (ref 4–15)
MV PEAK A VEL: 0.85 M/S
MV PEAK E VEL: 1.01 M/S
MV STENOSIS PRESSURE HALF TIME: 51.28 MS
MV VALVE AREA P 1/2 METHOD: 4.29 CM2
NEUTROPHILS # BLD AUTO: 5 K/UL (ref 1.8–7.7)
NEUTROPHILS NFR BLD: 61.3 % (ref 38–73)
NITRITE UR QL STRIP: NEGATIVE
NRBC BLD-RTO: 0 /100 WBC
OHS QRS DURATION: 106 MS
OHS QTC CALCULATION: 426 MS
OPIATES UR QL SCN: ABNORMAL
PCP UR QL SCN>25 NG/ML: NEGATIVE
PH UR STRIP: 7 [PH] (ref 5–8)
PHOSPHATE SERPL-MCNC: 3.4 MG/DL (ref 2.7–4.5)
PISA TR MAX VEL: 1.15 M/S
PLATELET # BLD AUTO: 207 K/UL (ref 150–450)
PMV BLD AUTO: 8.8 FL (ref 9.2–12.9)
POCT GLUCOSE: 116 MG/DL (ref 70–110)
POTASSIUM SERPL-SCNC: 4.7 MMOL/L (ref 3.5–5.1)
PROT SERPL-MCNC: 5.5 G/DL (ref 6–8.4)
PROT UR QL STRIP: NEGATIVE
PULM VEIN S/D RATIO: 1.33
PV PEAK D VEL: 0.36 M/S
PV PEAK GRADIENT: 2 MMHG
PV PEAK S VEL: 0.48 M/S
PV PEAK VELOCITY: 0.75 M/S
RA MAJOR: 4.13 CM
RA PRESSURE ESTIMATED: 8 MMHG
RA WIDTH: 4.6 CM
RBC # BLD AUTO: 3.32 M/UL (ref 4.6–6.2)
RIGHT VENTRICULAR END-DIASTOLIC DIMENSION: 3.76 CM
RV TB RVSP: 9 MMHG
RV TISSUE DOPPLER FREE WALL SYSTOLIC VELOCITY 1 (APICAL 4 CHAMBER VIEW): 12.31 CM/S
SINUS: 3.87 CM
SODIUM SERPL-SCNC: 130 MMOL/L (ref 136–145)
SP GR UR STRIP: 1.01 (ref 1–1.03)
STJ: 2.86 CM
TDI LATERAL: 0.14 M/S
TDI SEPTAL: 0.1 M/S
TDI: 0.12 M/S
TOXICOLOGY INFORMATION: ABNORMAL
TR MAX PG: 5 MMHG
TRICUSPID ANNULAR PLANE SYSTOLIC EXCURSION: 2.53 CM
TROPONIN I SERPL DL<=0.01 NG/ML-MCNC: <0.006 NG/ML (ref 0–0.03)
TV PEAK GRADIENT: 1 MMHG
TV REST PULMONARY ARTERY PRESSURE: 13 MMHG
URN SPEC COLLECT METH UR: NORMAL
UROBILINOGEN UR STRIP-ACNC: NEGATIVE EU/DL
WBC # BLD AUTO: 8.19 K/UL (ref 3.9–12.7)
Z-SCORE OF LEFT VENTRICULAR DIMENSION IN END DIASTOLE: -2.16
Z-SCORE OF LEFT VENTRICULAR DIMENSION IN END SYSTOLE: -1.85

## 2024-02-27 PROCEDURE — 25000003 PHARM REV CODE 250

## 2024-02-27 PROCEDURE — 80053 COMPREHEN METABOLIC PANEL: CPT

## 2024-02-27 PROCEDURE — 80307 DRUG TEST PRSMV CHEM ANLYZR: CPT | Performed by: EMERGENCY MEDICINE

## 2024-02-27 PROCEDURE — 25500020 PHARM REV CODE 255: Performed by: STUDENT IN AN ORGANIZED HEALTH CARE EDUCATION/TRAINING PROGRAM

## 2024-02-27 PROCEDURE — 25000003 PHARM REV CODE 250: Performed by: EMERGENCY MEDICINE

## 2024-02-27 PROCEDURE — 84100 ASSAY OF PHOSPHORUS: CPT

## 2024-02-27 PROCEDURE — 83036 HEMOGLOBIN GLYCOSYLATED A1C: CPT | Performed by: HOSPITALIST

## 2024-02-27 PROCEDURE — 84484 ASSAY OF TROPONIN QUANT: CPT

## 2024-02-27 PROCEDURE — 21400001 HC TELEMETRY ROOM

## 2024-02-27 PROCEDURE — 83735 ASSAY OF MAGNESIUM: CPT

## 2024-02-27 PROCEDURE — 85025 COMPLETE CBC W/AUTO DIFF WBC: CPT

## 2024-02-27 PROCEDURE — 81003 URINALYSIS AUTO W/O SCOPE: CPT | Mod: 59 | Performed by: EMERGENCY MEDICINE

## 2024-02-27 PROCEDURE — 25000003 PHARM REV CODE 250: Performed by: NURSE PRACTITIONER

## 2024-02-27 PROCEDURE — 99222 1ST HOSP IP/OBS MODERATE 55: CPT | Mod: ,,, | Performed by: INTERNAL MEDICINE

## 2024-02-27 PROCEDURE — 97165 OT EVAL LOW COMPLEX 30 MIN: CPT

## 2024-02-27 PROCEDURE — 25000003 PHARM REV CODE 250: Performed by: HOSPITALIST

## 2024-02-27 PROCEDURE — 97161 PT EVAL LOW COMPLEX 20 MIN: CPT

## 2024-02-27 RX ORDER — ASPIRIN 81 MG/1
81 TABLET ORAL DAILY
Status: DISCONTINUED | OUTPATIENT
Start: 2024-02-28 | End: 2024-02-28 | Stop reason: HOSPADM

## 2024-02-27 RX ORDER — VALSARTAN 80 MG/1
320 TABLET ORAL DAILY
Status: DISCONTINUED | OUTPATIENT
Start: 2024-02-27 | End: 2024-02-28 | Stop reason: HOSPADM

## 2024-02-27 RX ORDER — ATORVASTATIN CALCIUM 10 MG/1
20 TABLET, FILM COATED ORAL NIGHTLY
Status: DISCONTINUED | OUTPATIENT
Start: 2024-02-28 | End: 2024-02-28 | Stop reason: HOSPADM

## 2024-02-27 RX ORDER — VALSARTAN 80 MG/1
320 TABLET ORAL DAILY
Status: DISCONTINUED | OUTPATIENT
Start: 2024-02-28 | End: 2024-02-27

## 2024-02-27 RX ORDER — CLOPIDOGREL BISULFATE 75 MG/1
75 TABLET ORAL DAILY
Status: DISCONTINUED | OUTPATIENT
Start: 2024-02-28 | End: 2024-02-28 | Stop reason: HOSPADM

## 2024-02-27 RX ORDER — POLYETHYLENE GLYCOL 3350 17 G/17G
17 POWDER, FOR SOLUTION ORAL DAILY
Status: DISCONTINUED | OUTPATIENT
Start: 2024-02-27 | End: 2024-02-28 | Stop reason: HOSPADM

## 2024-02-27 RX ORDER — GABAPENTIN 300 MG/1
600 CAPSULE ORAL 3 TIMES DAILY
Status: DISCONTINUED | OUTPATIENT
Start: 2024-02-27 | End: 2024-02-28 | Stop reason: HOSPADM

## 2024-02-27 RX ORDER — METOPROLOL TARTRATE 25 MG/1
25 TABLET, FILM COATED ORAL 2 TIMES DAILY
Status: DISCONTINUED | OUTPATIENT
Start: 2024-02-27 | End: 2024-02-28 | Stop reason: HOSPADM

## 2024-02-27 RX ORDER — CLOPIDOGREL BISULFATE 75 MG/1
75 TABLET ORAL DAILY
Status: DISCONTINUED | OUTPATIENT
Start: 2024-02-28 | End: 2024-02-27

## 2024-02-27 RX ORDER — ASPIRIN 81 MG/1
81 TABLET ORAL DAILY
Status: DISCONTINUED | OUTPATIENT
Start: 2024-02-27 | End: 2024-02-27

## 2024-02-27 RX ORDER — HYDROCODONE BITARTRATE AND ACETAMINOPHEN 10; 325 MG/1; MG/1
1 TABLET ORAL EVERY 6 HOURS PRN
Status: DISCONTINUED | OUTPATIENT
Start: 2024-02-27 | End: 2024-02-28 | Stop reason: HOSPADM

## 2024-02-27 RX ORDER — ASPIRIN 81 MG/1
81 TABLET ORAL DAILY
Status: DISCONTINUED | OUTPATIENT
Start: 2024-02-28 | End: 2024-02-27

## 2024-02-27 RX ORDER — GABAPENTIN 600 MG/1
1 TABLET ORAL 3 TIMES DAILY
COMMUNITY

## 2024-02-27 RX ORDER — HYDROCODONE BITARTRATE AND ACETAMINOPHEN 10; 325 MG/1; MG/1
TABLET ORAL
COMMUNITY

## 2024-02-27 RX ORDER — OLMESARTAN MEDOXOMIL 40 MG/1
40 TABLET ORAL DAILY
COMMUNITY
Start: 2023-11-29

## 2024-02-27 RX ADMIN — VALSARTAN 320 MG: 80 TABLET, FILM COATED ORAL at 03:02

## 2024-02-27 RX ADMIN — ASPIRIN 81 MG: 81 TABLET, COATED ORAL at 09:02

## 2024-02-27 RX ADMIN — DOCUSATE SODIUM 50MG AND SENNOSIDES 8.6MG 1 TABLET: 8.6; 5 TABLET, FILM COATED ORAL at 08:02

## 2024-02-27 RX ADMIN — METOPROLOL TARTRATE 25 MG: 25 TABLET, FILM COATED ORAL at 08:02

## 2024-02-27 RX ADMIN — GABAPENTIN 600 MG: 300 CAPSULE ORAL at 08:02

## 2024-02-27 RX ADMIN — IOHEXOL 75 ML: 350 INJECTION, SOLUTION INTRAVENOUS at 01:02

## 2024-02-27 RX ADMIN — ATORVASTATIN CALCIUM 40 MG: 10 TABLET, FILM COATED ORAL at 09:02

## 2024-02-27 RX ADMIN — ASPIRIN 325 MG ORAL TABLET 325 MG: 325 PILL ORAL at 12:02

## 2024-02-27 RX ADMIN — HYDROCODONE BITARTRATE AND ACETAMINOPHEN 1 TABLET: 10; 325 TABLET ORAL at 02:02

## 2024-02-27 RX ADMIN — POLYETHYLENE GLYCOL 3350 17 G: 17 POWDER, FOR SOLUTION ORAL at 08:02

## 2024-02-27 NOTE — ED NOTES
Pt appears to be resting . Respirations even and unlabored. Equal rise and fall of chest noted. Skin warm and dry. Pt easily aroused to verbal stimulation. Care ongoing.

## 2024-02-27 NOTE — ED NOTES
"Pt states that he usually takes a "piece" of his 300mg gabapentin and does not know exactly how much he takes. Pt states that he does not want to take it and that he would like his norco.   "

## 2024-02-27 NOTE — NURSING
Patient has arrived on the unit via stretcher, awake and alert, no distress noted. Ambulated to bed independently. Oriented to room, call light given. Able to move arm up and down, fingers slightly tingly, but patient says this is normal. States his significant other knows he's admitted to the hospital and is ok to discuss information with her. 4 eyes and Vitals completed. Call light given

## 2024-02-27 NOTE — ED NOTES
Patient provided with meal and po fluids. Patient is on cell phone with significant other. NAD noted. Care ongoing.

## 2024-02-27 NOTE — ED NOTES
Patient presents to ED secondary to syncopal episode accompanied with RUE weakness. Patient reports was unable to move right arm at all. States while in route to ED, patient began to have more purposeful movements to RUE. Upon arrival to ED, RUE weakness has completely resolved but still reporting some tingling sensation in right digits. EMS reports patient was hypotensive, confused and had pinpoint pupils upon their arrival. Patient was given 2mg of narcan which improved patient's blood pressure and mentation. Patient endorses taking prescribed norco just prior to this event.

## 2024-02-27 NOTE — ASSESSMENT & PLAN NOTE
-Patient presents with right arm weakness and numbness since earlier tonight.  Was found hypotensive with pinpoint pupils by EMS.  Was given Narcan by EMS with resolution of symptoms.    -On physical exam: neurologically intact, no deficits, no cranial nerve weakness, 5/5 upper and lower muscle strength with slight muscle weakness on the right arm  -CT head was negative for acute process.  -Could be related to opioid use versus TIA     Plan:  Monitor neurologic symptoms   MRI brain ordered  Echo ordered  Started on aspirin/statin  Neuro checks Q4hrs/fall precautions/aspiration precautions/telemetry  PT/OT/SP evaluation   Neurology consult and would appreciate recommendations  Permissive HTN

## 2024-02-27 NOTE — ED NOTES
Patient placed in gown. Belongings in belongs bag at bedside. Wallet placed inside shoe inside of bag. Cell phone in bed with patient. Denies needs or complaints at this time. Lights turned off as requested by patient. Safety and comfort measures maintained. Care ongoing.

## 2024-02-27 NOTE — CONSULTS
Food & Nutrition  Education    Diet Education: Cardiac Diet  Time Spent: 15 min  Learners: Prasanna Lee      Nutrition Education provided with handouts: Diet and Health - DASH diet - Dietary Fats- Low Salt diet      Comments: RD consult for education on cardiac diet. Pt unable to be educated in ED. RD to attempt education once patient moves to floor.      All questions and concerns answered. Dietitian's contact information provided.         Please Re-consult as needed        Thanks!   Radha Velazco, Registration Eligible, Provisional LDN

## 2024-02-27 NOTE — NURSING
[facility] [place]  Nurses Note -- 4 Eyes      2/27/2024       Skin assessed on: Q Shift      [x] No Pressure Injuries Present    [x]Prevention Measures Documented    [] Yes LDA  for Pressure Injury Previously documented     [] Yes New Pressure Injury Discovered   [] LDA for New Pressure Injury Added      Attending RN:  Anisha Mc LPN     Second RN:  Masha MASTERS

## 2024-02-27 NOTE — ASSESSMENT & PLAN NOTE
Chronic, controlled. Latest blood pressure and vitals reviewed-     Temp:  [98.7 °F (37.1 °C)-98.9 °F (37.2 °C)]   Pulse:  [62-74]   Resp:  [13-20]   BP: ()/(49-61)   SpO2:  [97 %-100 %] .   Home meds for hypertension were reviewed and noted below.   Hypertension Medications               amLODIPine (NORVASC) 10 MG tablet amlodipine 10 mg tablet   TAKE 1 TABLET BY MOUTH EVERY DAY    metoprolol tartrate (LOPRESSOR) 25 MG tablet metoprolol tartrate 25 mg tablet   TAKE 1 TABLET BY MOUTH TWICE DAILY    olmesartan (BENICAR) 20 MG tablet Take 20 mg by mouth.            While in the hospital, will manage blood pressure as follows; Adjust home antihypertensive regimen as follows- hold home blood pressure medications pending MRI brain and for permissive hypertension    Will utilize p.r.n. blood pressure medication only if patient's blood pressure greater than 220/110 and he develops symptoms such as worsening chest pain or shortness of breath.

## 2024-02-27 NOTE — ED NOTES
Resumed care of pt. Pt awake and alert. Pt denies any complaints or weakness at this time. Pt updated on plan of care. No needs at this time.

## 2024-02-27 NOTE — H&P
"  [facility] Medicine  History & Physical    Patient Name: Prasanna Scott  MRN: [id number]  Patient Class: OP- Observation  Admission Date: 2/26/2024  Attending Physician: Elinor Hughes MD   Primary Care Provider: Bambi Yang MD         Patient information was obtained from patient, past medical records, and ER records.     Subjective:     Principal Problem:Arm numbness    Chief Complaint:   Chief Complaint   Patient presents with    Hypotension     Pt arrived via ems, pt chief complaint is Hypotension. Pt activated 911 due to numbness in his right arm. When ems arrived pt was found hypotensive and pin point pupils but awake and alert. Pt had a systolic BP 70p with ems. Pt was given 2mg narcan IV push and symptoms has began to resolve. Pt is awake and alert at this time.         HPI: This is a 68-year-old male with a past medical history of hypertension, BPH, GERD, tobacco use who presents the hospital via EMS for right arm weakness and numbness from earlier tonight. Patient states that he was reclining back in the couch when he realized he could not move his right arm and felt really "heavy".  Per EMS, patient was found hypotensive with pinpoint pupils but awake and alert. His systolic blood pressure was 70 and he was given Narcan 2 g IV push when his symptoms improve. Patient states that his girlfriend thought he seemed confused and altered as he was speaking "gibberish".  Patient states that he has a history of cervical spine surgery and is symptoms could be attributed to that.  Patient denies using any illicit substances.  He denies any other alleviating or exacerbating factors. Patient denies headache, fever, chills, chest pain, shortness on breath, nausea, vomiting, diarrhea, or any other associated symptoms.    In the ED, the patient was hemodynamically stable. Labs were remarkable for 2 normocytic anemia (11.1), hyponatremia (130).  UDS was positive for opioids and " marijuana. Chest x-ray was negative for acute process.  CT head showed no acute intracranial abnormality.  Patient was given 1 L of NS, aspirin 325 mg.      Case discussed with ED provider.    Prasanna Scott was placed under observation for further medical management.    Past Medical History:   Diagnosis Date    Arthritis        Past Surgical History:   Procedure Laterality Date    arm surgury      CARPAL TUNNEL RELEASE      neck surgury      TIBIA FRACTURE SURGERY      ULNAR NERVE TRANSPOSITION         Review of patient's allergies indicates:  No Known Allergies    No current facility-administered medications on file prior to encounter.     Current Outpatient Medications on File Prior to Encounter   Medication Sig    amLODIPine (NORVASC) 10 MG tablet amlodipine 10 mg tablet   TAKE 1 TABLET BY MOUTH EVERY DAY    gabapentin (NEURONTIN) 250 mg/5 mL solution Take by mouth 3 (three) times daily.    hydrocodone-acetaminophen 5-325mg (NORCO) 5-325 mg per tablet Take 1 tablet by mouth every 6 (six) hours as needed for Pain.    metoprolol tartrate (LOPRESSOR) 25 MG tablet metoprolol tartrate 25 mg tablet   TAKE 1 TABLET BY MOUTH TWICE DAILY    olmesartan (BENICAR) 20 MG tablet Take 20 mg by mouth.    ergocalciferol (ERGOCALCIFEROL) 50,000 unit Cap Take 1 capsule by mouth every 7 days.    tadalafiL (CIALIS) 10 MG tablet Cialis 10 mg tablet   Take 1 tablet every day by oral route as needed for 30 days.     Family History    None       Tobacco Use    Smoking status: Former     Current packs/day: 0.00     Types: Cigarettes     Quit date: 2017     Years since quittin.7    Smokeless tobacco: Former   Substance and Sexual Activity    Alcohol use: Yes     Alcohol/week: 5.0 standard drinks of alcohol     Types: 5 Cans of beer per week    Drug use: No    Sexual activity: Yes     Partners: Female     Review of Systems   Constitutional: Negative.    HENT: Negative.     Respiratory: Negative.     Cardiovascular: Negative.     Gastrointestinal: Negative.    Genitourinary: Negative.    Musculoskeletal:  Positive for back pain, myalgias, neck pain and neck stiffness.        Right arm weakness and numbness which has resolved   Neurological:  Positive for weakness.        Right arm weakness   Psychiatric/Behavioral: Negative.       Objective:     Vital Signs (Most Recent):  Temp: 98.9 °F (37.2 °C) (02/27/24 0022)  Pulse: 65 (02/27/24 0122)  Resp: 16 (02/27/24 0122)  BP: 111/61 (02/27/24 0122)  SpO2: 97 % (02/27/24 0122) Vital Signs (24h Range):  Temp:  [98.7 °F (37.1 °C)-98.9 °F (37.2 °C)] 98.9 °F (37.2 °C)  Pulse:  [62-74] 65  Resp:  [13-20] 16  SpO2:  [97 %-100 %] 97 %  BP: ()/(49-61) 111/61     Weight: 74.8 kg (165 lb)  Body mass index is 22.38 kg/m².     Physical Exam  Constitutional:       General: He is not in acute distress.     Appearance: Normal appearance. He is not ill-appearing or diaphoretic.   HENT:      Head: Normocephalic and atraumatic.      Mouth/Throat:      Mouth: Mucous membranes are moist.   Eyes:      Extraocular Movements: Extraocular movements intact.   Cardiovascular:      Rate and Rhythm: Normal rate and regular rhythm.      Pulses: Normal pulses.   Pulmonary:      Effort: Pulmonary effort is normal.      Comments: Speaking in full sentences  Abdominal:      General: Abdomen is flat.   Musculoskeletal:      Right lower leg: No edema.      Left lower leg: No edema.   Skin:     General: Skin is warm.   Neurological:      General: No focal deficit present.      Mental Status: He is alert and oriented to person, place, and time. Mental status is at baseline.      GCS: GCS eye subscore is 4. GCS verbal subscore is 5. GCS motor subscore is 6.      Cranial Nerves: Cranial nerves 2-12 are intact.      Sensory: Sensation is intact.      Motor: Weakness present.      Comments: Slight weakness noticed on right arm however unremarkable exam   Psychiatric:         Mood and Affect: Mood normal.         Behavior: Behavior  normal.         Thought Content: Thought content normal.             Significant Labs: All pertinent labs within the past 24 hours have been reviewed.    Significant Imaging: I have reviewed all pertinent imaging results/findings within the past 24 hours.  Imaging Results              X-Ray Chest AP Portable (Final result)  Result time 02/26/24 23:26:07      Final result by Wilfredo Rodriguez MD (02/26/24 23:26:07)                   Impression:      No acute cardiopulmonary process identified.      Electronically signed by: Wilfredo Rodriguez MD  Date:    02/26/2024  Time:    23:26               Narrative:    EXAMINATION:  XR CHEST AP PORTABLE    CLINICAL HISTORY:  Altered mental status, unspecified    TECHNIQUE:  Single frontal view of the chest was performed.    COMPARISON:  None    FINDINGS:  Cardiac silhouette is normal in size.  Lungs are symmetrically expanded.  No evidence of focal consolidative process, pneumothorax, or significant pleural effusion.  No acute osseous abnormality identified.                                       CT Head Without Contrast (Final result)  Result time 02/26/24 22:41:26      Final result by Wilfredo Rodriguez MD (02/26/24 22:41:26)                   Impression:      No acute intracranial abnormalities identified.      Electronically signed by: Wilfredo Rodriguez MD  Date:    02/26/2024  Time:    22:41               Narrative:    EXAMINATION:  CT HEAD WITHOUT CONTRAST    CLINICAL HISTORY:  Neuro deficit, acute, stroke suspected;    TECHNIQUE:  Low dose axial images were obtained through the head.  Coronal and sagittal reformations were also performed. Contrast was not administered.    COMPARISON:  CT head from February 2016.    FINDINGS:  There is mild chronic microvascular ischemic change.  No evidence of acute/recent major vascular distribution cerebral infarction, intraparenchymal hemorrhage, or intra-axial space occupying lesion. The ventricular system is normal in size and  configuration with no evidence of hydrocephalus. No effacement of the skull-base cisterns. No abnormal extra-axial fluid collections or blood products.  Patchy ethmoid sinus disease is noted.  Remaining visualized paranasal sinuses and mastoid air cells are essentially clear.  The calvarium shows no significant abnormality.                                    Assessment/Plan:     * Arm numbness  -Patient presents with right arm weakness and numbness since earlier tonight.  Was found hypotensive with pinpoint pupils by EMS.  Was given Narcan by EMS with resolution of symptoms.    -On physical exam: neurologically intact, no deficits, no cranial nerve weakness, 5/5 upper and lower muscle strength with slight muscle weakness on the right arm  -CT head was negative for acute process.  -Could be related to opioid use versus TIA     Plan:  Monitor neurologic symptoms   MRI brain ordered  Echo ordered  Started on aspirin/statin  Neuro checks Q4hrs/fall precautions/aspiration precautions/telemetry  PT/OT/SP evaluation   Neurology consult and would appreciate recommendations  Permissive HTN    Unintentional opioid overdose  Patient was found unresponsive and hypotensive after taking his Norco  mg  Responded to Narcan by EMS    Plan:  Prescribe Narcan on discharge   on safe opioid use      Hypertension  Chronic, controlled. Latest blood pressure and vitals reviewed-     Temp:  [98.7 °F (37.1 °C)-98.9 °F (37.2 °C)]   Pulse:  [62-74]   Resp:  [13-20]   BP: ()/(49-61)   SpO2:  [97 %-100 %] .   Home meds for hypertension were reviewed and noted below.   Hypertension Medications               amLODIPine (NORVASC) 10 MG tablet amlodipine 10 mg tablet   TAKE 1 TABLET BY MOUTH EVERY DAY    metoprolol tartrate (LOPRESSOR) 25 MG tablet metoprolol tartrate 25 mg tablet   TAKE 1 TABLET BY MOUTH TWICE DAILY    olmesartan (BENICAR) 20 MG tablet Take 20 mg by mouth.            While in the hospital, will manage blood  pressure as follows; Adjust home antihypertensive regimen as follows- hold home blood pressure medications pending MRI brain and for permissive hypertension    Will utilize p.r.n. blood pressure medication only if patient's blood pressure greater than 220/110 and he develops symptoms such as worsening chest pain or shortness of breath.      VTE Risk Mitigation (From admission, onward)           Ordered     IP VTE LOW RISK PATIENT  Once         02/26/24 2356     Place sequential compression device  Until discontinued         02/26/24 2356                         On 02/27/2024, patient should be placed in hospital observation services under my care in collaboration with Elinor Hughes MD.      AdmissionCare    Guideline: Transient Ischemic Attack (TIA) - OBS, Observation    Based on the indications selected for the patient, the bed status of Observation was determined to be MET    The following indications were selected as present at the time of evaluation of the patient:      - Appropriate evaluation (brain MRI, echocardiogram, carotid imaging) cannot be completed in emergency department time frame (3 to 4 hours).    AdmissionCare documentation entered by: JAMIE Marquez    [facility], 27th edition, Copyright © 2023 [facility] [employer], [facility] All Rights Reserved.  [id number]:00    Irlanda Aparicio PA-C  Department of Hospital Medicine  [facility] [place] - Emergency Dept

## 2024-02-27 NOTE — ASSESSMENT & PLAN NOTE
Patient was found unresponsive and hypotensive after taking his Norco  mg  Responded to Narcan by EMS    Plan:  Prescribe Narcan on discharge   on safe opioid use

## 2024-02-27 NOTE — CONSULTS
[facility]  Neurology Consult Note    Patient Name: Prasanna Scott  Age: 68 y.o.  MRN: [id number]  Admission Date: 2/26/2024  Reason for consult:  Right arm numbness    CC:  Right arm numbness    HPI:   Prasanna Scott is a 68 y.o. year old male with a past medical history of hypertension, BPH, GERD, tobacco use presented to the ED yesterday for right arm weakness.  History obtained from patient, his girlfriend present in the room and chart review.    Patient states that he was sitting across his girlfriend and talking to her.  While he was speaking, when he tried to lift his right arm, he suddenly started feeling heavy.  He states that he was using his shoulder to  his right arm which would then barely be able to lift above the couch but would drop back down again.  He was also experiencing some numbness and tingling in his hand but he usually has sensory symptoms from C-spine radiculopathy that is present bilaterally.  Around the same time, his girlfriend noticed that patient was having garbled speech however patient thought that he was communicating what he was trying to say.  EMS was called at this time.  SBP for EMS was 70.  Patient was given Narcan 2 g IV push with improvement in his symptoms.  Total duration of symptoms were around 30 minutes    In the ED, .  Rest of the vitals were unremarkable.  UDS was positive for opiates and marijuana.  Blood work remarkable for sodium 130.  CT head was obtained with no abnormality noted.       Patient is a chronic smoker.  Otherwise able to carry out ADLs independently.  Girlfriend states that he has had multiple episodes of hypotension leading to syncopal episodes.  Has not had prior focal neurological symptoms like these.      Histories:  Allergies:  Patient has no known allergies.    Current Medications:    Current Facility-Administered Medications   Medication Dose Route Frequency Provider Last Rate Last Admin    aspirin EC tablet 81 mg   81 mg Oral Daily Mahmud, Rashed, PA-C   81 mg at 24 09    atorvastatin tablet 40 mg  40 mg Oral Daily Mahmud, Rashed, PA-C   40 mg at 24 0906    bisacodyL suppository 10 mg  10 mg Rectal Daily PRN Mahmud, Rashed, PA-C        labetaloL injection 10 mg  10 mg Intravenous Q15 Min PRN Mahmud, Rashed, PA-C        ondansetron injection 4 mg  4 mg Intravenous Q12H PRN Mahmud, Rashed, PA-C        senna-docusate 8.6-50 mg per tablet 1 tablet  1 tablet Oral Daily PRN Mahmud, Rashed, PA-C        sodium chloride 0.9% bolus 500 mL 500 mL  500 mL Intravenous Continuous PRN Mahmud, Rashed, PA-C        sodium chloride 0.9% flush 10 mL  10 mL Intravenous PRN Mahmud, Rashed, PA-C         Current Outpatient Medications   Medication Sig Dispense Refill    amLODIPine (NORVASC) 10 MG tablet amlodipine 10 mg tablet   TAKE 1 TABLET BY MOUTH EVERY DAY      ergocalciferol (ERGOCALCIFEROL) 50,000 unit Cap Take 1 capsule by mouth every 7 days.      gabapentin (NEURONTIN) 600 MG tablet Take 1 tablet by mouth 3 (three) times daily.      HYDROcodone-acetaminophen (NORCO)  mg per tablet TAKE 1 TABLET BY MOUTH FIVE TIMES DAILY AS NEEDED FOR PAIN      metoprolol tartrate (LOPRESSOR) 25 MG tablet metoprolol tartrate 25 mg tablet   TAKE 1 TABLET BY MOUTH TWICE DAILY      olmesartan (BENICAR) 40 MG tablet Take 40 mg by mouth once daily.      tadalafiL (CIALIS) 10 MG tablet Cialis 10 mg tablet   Take 1 tablet every day by oral route as needed for 30 days. 30 tablet 11       Medical:   Past Medical History:   Diagnosis Date    Arthritis         Surgeries:   Past Surgical History:   Procedure Laterality Date    arm surgury      CARPAL TUNNEL RELEASE      neck surgury      TIBIA FRACTURE SURGERY      ULNAR NERVE TRANSPOSITION          Family: History reviewed. No pertinent family history.,     Tobacco Use    Smoking status: Former     Current packs/day: 0.00     Types: Cigarettes     Quit date: 2017     Years since quittin.7     Smokeless tobacco: Former   Substance and Sexual Activity    Alcohol use: Yes     Alcohol/week: 5.0 standard drinks of alcohol     Types: 5 Cans of beer per week    Drug use: No    Sexual activity: Yes     Partners: Female         Physical Exam:     Physical Examination  /71   Pulse 67   Temp 98.8 °F (37.1 °C) (Oral)   Resp 19   Ht 6' (1.829 m)   Wt 74.8 kg (165 lb)   SpO2 98%   BMI 22.38 kg/m²   Body mass index is 22.38 kg/m².    NIHSS ([employer] [place] [employer] Stroke Scale)   1a  Level of consciousness: 0=alert; keenly responsive   1b. LOC questions:  0 = Answers both questions correctly   1c. LOC commands: 0=Answers both tasks correctly   2.  Best Gaze: 0=normal   3. Visual: 0=No visual loss   4. Facial Palsy: 0=Normal symmetric movement   5a. Motor left arm: 0=No drift, limb holds 90 (or 45) degrees for full 10 seconds   5b.  Motor right arm: 0=No drift, limb holds 90 (or 45) degrees for full 10 seconds   6a. motor left le=No drift, limb holds 90 (or 45) degrees for full 10 seconds   6b  Motor right le=No drift, limb holds 90 (or 45) degrees for full 10 seconds   7. Limb Ataxia: 0=Absent   8.  Sensory: 0=Normal; no sensory loss   9. Best Language:  0=No aphasia, normal   10. Dysarthria: 0=Normal   11. Extinction and Inattention: 0=No abnormality         Total:   0        Significant Labs:   LDL 53.8  Hemoglobin A1c pending    Significant Imaging:   Following images were personally reviewed and interpreted  CT head without contrast : [facility]    Echo    Left Ventricle: There is normal systolic function with a visually estimated ejection fraction of 55 - 60%. There is normal diastolic function.    Right Ventricle: Normal right ventricular cavity size. Systolic function is normal.    Aorta: Aortic root is mildly dilated measuring 3.87 cm. Ascending aorta is mildly dilated measuring 3.66 cm.    No intracardiac source of embolus noted on this exam.  If high clinical suspicion, consider MICHAEL +/-  bubble study.    Assessment and Plan:   68 y.o. year old male with a past medical history of hypertension, BPH, GERD, tobacco use presented to the ED yesterday for right arm weakness.  Symptom onset with right arm weakness along with speech abnormality lasting for a total of 30 minutes.  Unclear if speech abnormality was expressive aphasia versus transient confusion.  SBP 70 for EMS, symptoms improved with Narcan.  UDS positive for opiates and marijuana.  Given the clear onset of right-sided weakness in the setting of questionable expressive aphasia, event is concerning for vascular etiology.  ABCD2 score of 4.    Plan:   -MRI brain w/o contrast  -CTA head and neck  - starting on aspirin 81 mg daily  - after loading with ASA 325mg and Plavix 300mg. Daily aspirin 81 mg /  clopidogrel 75 mg x 21 days followed by ASA 81mg OD monotherapy therafter.  If intracranial HGS noted on CTA head, continue DAPT for 3 months.  - start on Lipitor 20 mg.  LDL at goal  -Holter monitor at discharge  -maintain normotensive blood pressure, long term < 140/90   -Goal Parameters for TIA/stroke: LDL<70 mg/dl, HbA1C: <7.0 %,  SBP<130/80 (discussed risk factor optimization in order to reduce the risk of future events with help of PCP)  -PT/OT evaluation and therapy goals per their recommendations  -Diet and Exercise: Discussed aggressive lifestyle modification. Eat primarily plant-based foods, such as fruits and vegetables, whole grains, legumes (beans) and nuts. Discussed Mediterranean diet. Daily exercise (150 minutes per week).  -Provided education regarding future stroke identification: I went over the usual stroke warning signs and symptoms, and the need to activate EMS (call 911) as soon as the symptoms present including-sudden onset numbness or weakness of the face, arm, or leg; especially on one side of the body; sudden confusion, trouble speaking, or understanding; sudden trouble seeing in one or both eyes; sudden trouble walking;  dizziness; loss of coordination.   - follow up in stroke Neurology Clinic as outpatient  - patient will need follow up with Cardiology for further evaluation of hypotension and syncopal episodes        Thank you for your consult. I will sign off. Please contact us if you have any additional questions.    Time spent on this encounter: 70 minutes. This includes face to face time and non-face to face time preparing to see the patient (eg, review of tests), obtaining and/or reviewing separately obtained history, documenting clinical information in the electronic or other health record, independently interpreting results and communicating results to the patient/family/caregiver, or care coordinator.     Diana Newell MD  Neurology  [facility]

## 2024-02-27 NOTE — PT/OT/SLP EVAL
"Physical Therapy Evaluation     Patient Name: Prasanna Scott   MRN: [id number]  Recent Surgery: * No surgery found *      Recommendations:     Discharge Recommendations: No Therapy Indicated   Discharge Equipment Recommendations: none     Assessment:     Prasanna Scott is a 68 y.o. male admitted with a medical diagnosis of Arm numbness. He presents with the following impairments/functional limitations:  (none observed).     Rehab Prognosis: Good; patient demonstrates safety with transfers and gait, no further skilled PT needed at this time.    Plan:     During this hospitalization, patient to be seen 1x to address the above listed problems via initial evaluation.    Plan of Care Expires: 02/27/24    Subjective     Chief Complaint: None  Patient Comments/Goals: Pt reports all symptoms have resolved/"I want to go home before my girlfriend leaves to go out of town"  Pain/Comfort:  Pain Rating 1: 7/10  Location 1: neck (and back)    Social History:  Living Environment: Patient lives alone in a single story home with number of outside stair(s): 1  Prior Level of Function: Prior to admission, patient was independent  Equipment Used at Home: shower chair  DME owned (not currently used): single point cane; reports using it in the community.  Assistance Upon Discharge: none (not needed)    Objective:     Communicated with nurseChela prior to session. Patient found supine with blood pressure cuff, telemetry, pulse ox (continuous) upon PT entry to room.    General Precautions: Standard,     Orthopedic Precautions: N/A   Braces: N/A    Respiratory Status: Nasal cannula, flow 2 L/min; O2 sats on RA dropped to 91%.    Exams:  Cognition: Patient is oriented to Person, Place, Time, Situation  RLE ROM: WFL  RLE Strength: WFL  LLE ROM: WFL except ankle decreased DF  LLE Strength: WFL  Sensation:    -       Intact  light/touch BLEs    Functional Mobility:  Gait belt applied - No  Bed Mobility  Supine to Sit: independence for LE " management and trunk management  Sit to Supine: independence for LE management and trunk management  Transfers  Sit to Stand: independence with no AD  Gait  Patient ambulated 90' with no AD and independence and supervision. Patient demonstrates steady gait. Pt ambulated to BR during gait, left with OT.   Balance  Sitting: independence  Standing: independence      Therapeutic Activities and Exercises:   Patient educated on role of acute care PT and PT POC and call light usage  Pt transferred OOB, ambulated in hallway, to BR then back to bed.  Pt demonstrates safety with transfers and gait, no further skilled PT needed at this time.    AM-PAC 6 CLICK MOBILITY  Total Score:24    Patient left HOB elevated with all lines intact, call button in reach, RN notified, and all needs in reach .    GOALS:   Multidisciplinary Problems       Physical Therapy Goals          Problem: Physical Therapy    Goal Priority Disciplines Outcome Goal Variances Interventions   Physical Therapy Goal     PT, PT/OT Adequate for Care Transition                         History:     Past Medical History:   Diagnosis Date    Arthritis        Past Surgical History:   Procedure Laterality Date    arm surgury      CARPAL TUNNEL RELEASE      neck surgury      TIBIA FRACTURE SURGERY      ULNAR NERVE TRANSPOSITION         Time Tracking:     PT Received On: 02/27/24  PT Start Time: 0941  PT Stop Time: 0959  PT Total Time (min): 18 min     Billable Minutes: Evaluation 18    2/27/2024

## 2024-02-27 NOTE — ED NOTES
"Patient reports all symptoms have resolved at this time except for tingling sensation to right, 2nd digit but just "barely" per patient. Care ongoing.   "

## 2024-02-27 NOTE — PROGRESS NOTES
CHW - Initial Contact    This Community Health Worker completed the Social Determinant of Health questionnaire with patient  at bedside  today.    Pt identified barriers of most importance are: has no needs at this time.   Referrals to community agencies completed with patient/caregiver consent outside of [facility] include: no: none at this time.  Referrals were put through [facility] - no: none at this time.  Support and Services: has no support at this time.  Other information discussed the patient needs / wants help with: Completed SDOH with patient at bedside today, patient has no needs at this time. Will follow up in one week to check pt's future needs or possible case closure.    Follow up required: yes.  Follow-up Outreach - Due: 3/5/2024

## 2024-02-27 NOTE — ADMISSIONCARE
AdmissionCare    Guideline: Transient Ischemic Attack (TIA) - OBS, Observation    Based on the indications selected for the patient, the bed status of Observation was determined to be MET    The following indications were selected as present at the time of evaluation of the patient:      - Appropriate evaluation (brain MRI, echocardiogram, carotid imaging) cannot be completed in emergency department time frame (3 to 4 hours).    AdmissionCare documentation entered by: JAMIE Marquez    [facility], 27th edition, Copyright © 2023 [facility] [employer], [facility] All Rights Reserved.  [id number]

## 2024-02-27 NOTE — SUBJECTIVE & OBJECTIVE
Past Medical History:   Diagnosis Date    Arthritis        Past Surgical History:   Procedure Laterality Date    arm surgury      CARPAL TUNNEL RELEASE      neck surgury      TIBIA FRACTURE SURGERY      ULNAR NERVE TRANSPOSITION         Review of patient's allergies indicates:  No Known Allergies    No current facility-administered medications on file prior to encounter.     Current Outpatient Medications on File Prior to Encounter   Medication Sig    amLODIPine (NORVASC) 10 MG tablet amlodipine 10 mg tablet   TAKE 1 TABLET BY MOUTH EVERY DAY    gabapentin (NEURONTIN) 250 mg/5 mL solution Take by mouth 3 (three) times daily.    hydrocodone-acetaminophen 5-325mg (NORCO) 5-325 mg per tablet Take 1 tablet by mouth every 6 (six) hours as needed for Pain.    metoprolol tartrate (LOPRESSOR) 25 MG tablet metoprolol tartrate 25 mg tablet   TAKE 1 TABLET BY MOUTH TWICE DAILY    olmesartan (BENICAR) 20 MG tablet Take 20 mg by mouth.    ergocalciferol (ERGOCALCIFEROL) 50,000 unit Cap Take 1 capsule by mouth every 7 days.    tadalafiL (CIALIS) 10 MG tablet Cialis 10 mg tablet   Take 1 tablet every day by oral route as needed for 30 days.     Family History    None       Tobacco Use    Smoking status: Former     Current packs/day: 0.00     Types: Cigarettes     Quit date: 2017     Years since quittin.7    Smokeless tobacco: Former   Substance and Sexual Activity    Alcohol use: Yes     Alcohol/week: 5.0 standard drinks of alcohol     Types: 5 Cans of beer per week    Drug use: No    Sexual activity: Yes     Partners: Female     Review of Systems   Constitutional: Negative.    HENT: Negative.     Respiratory: Negative.     Cardiovascular: Negative.    Gastrointestinal: Negative.    Genitourinary: Negative.    Musculoskeletal:  Positive for back pain, myalgias, neck pain and neck stiffness.        Right arm weakness and numbness which has resolved   Neurological:  Positive for weakness.        Right arm weakness    Psychiatric/Behavioral: Negative.       Objective:     Vital Signs (Most Recent):  Temp: 98.9 °F (37.2 °C) (02/27/24 0022)  Pulse: 65 (02/27/24 0122)  Resp: 16 (02/27/24 0122)  BP: 111/61 (02/27/24 0122)  SpO2: 97 % (02/27/24 0122) Vital Signs (24h Range):  Temp:  [98.7 °F (37.1 °C)-98.9 °F (37.2 °C)] 98.9 °F (37.2 °C)  Pulse:  [62-74] 65  Resp:  [13-20] 16  SpO2:  [97 %-100 %] 97 %  BP: ()/(49-61) 111/61     Weight: 74.8 kg (165 lb)  Body mass index is 22.38 kg/m².     Physical Exam  Constitutional:       General: He is not in acute distress.     Appearance: Normal appearance. He is not ill-appearing or diaphoretic.   HENT:      Head: Normocephalic and atraumatic.      Mouth/Throat:      Mouth: Mucous membranes are moist.   Eyes:      Extraocular Movements: Extraocular movements intact.   Cardiovascular:      Rate and Rhythm: Normal rate and regular rhythm.      Pulses: Normal pulses.   Pulmonary:      Effort: Pulmonary effort is normal.      Comments: Speaking in full sentences  Abdominal:      General: Abdomen is flat.   Musculoskeletal:      Right lower leg: No edema.      Left lower leg: No edema.   Skin:     General: Skin is warm.   Neurological:      General: No focal deficit present.      Mental Status: He is alert and oriented to person, place, and time. Mental status is at baseline.      GCS: GCS eye subscore is 4. GCS verbal subscore is 5. GCS motor subscore is 6.      Cranial Nerves: Cranial nerves 2-12 are intact.      Sensory: Sensation is intact.      Motor: Weakness present.      Comments: Slight weakness noticed on right arm however unremarkable exam   Psychiatric:         Mood and Affect: Mood normal.         Behavior: Behavior normal.         Thought Content: Thought content normal.             Significant Labs: All pertinent labs within the past 24 hours have been reviewed.    Significant Imaging: I have reviewed all pertinent imaging results/findings within the past 24 hours.  Imaging  Results              X-Ray Chest AP Portable (Final result)  Result time 02/26/24 23:26:07      Final result by Wilfredo Rodriguez MD (02/26/24 23:26:07)                   Impression:      No acute cardiopulmonary process identified.      Electronically signed by: Wilfredo Rodriguez MD  Date:    02/26/2024  Time:    23:26               Narrative:    EXAMINATION:  XR CHEST AP PORTABLE    CLINICAL HISTORY:  Altered mental status, unspecified    TECHNIQUE:  Single frontal view of the chest was performed.    COMPARISON:  None    FINDINGS:  Cardiac silhouette is normal in size.  Lungs are symmetrically expanded.  No evidence of focal consolidative process, pneumothorax, or significant pleural effusion.  No acute osseous abnormality identified.                                       CT Head Without Contrast (Final result)  Result time 02/26/24 22:41:26      Final result by Wilfredo Rodriguez MD (02/26/24 22:41:26)                   Impression:      No acute intracranial abnormalities identified.      Electronically signed by: Wilfredo Rodriguez MD  Date:    02/26/2024  Time:    22:41               Narrative:    EXAMINATION:  CT HEAD WITHOUT CONTRAST    CLINICAL HISTORY:  Neuro deficit, acute, stroke suspected;    TECHNIQUE:  Low dose axial images were obtained through the head.  Coronal and sagittal reformations were also performed. Contrast was not administered.    COMPARISON:  CT head from February 2016.    FINDINGS:  There is mild chronic microvascular ischemic change.  No evidence of acute/recent major vascular distribution cerebral infarction, intraparenchymal hemorrhage, or intra-axial space occupying lesion. The ventricular system is normal in size and configuration with no evidence of hydrocephalus. No effacement of the skull-base cisterns. No abnormal extra-axial fluid collections or blood products.  Patchy ethmoid sinus disease is noted.  Remaining visualized paranasal sinuses and mastoid air cells are essentially clear.   The calvarium shows no significant abnormality.

## 2024-02-27 NOTE — PLAN OF CARE
Problem: Occupational Therapy  Goal: Occupational Therapy Goal  Outcome: Met     OT eval is complete. The patient is at his functional baseline and no OT is recommended.

## 2024-02-27 NOTE — ED PROVIDER NOTES
Encounter Date: 2/26/2024       History     Chief Complaint   Patient presents with    Hypotension     Pt arrived via ems, pt chief complaint is Hypotension. Pt activated 911 due to numbness in his right arm. When ems arrived pt was found hypotensive and pin point pupils but awake and alert. Pt had a systolic BP 70p with ems. Pt was given 2mg narcan IV push and symptoms has began to resolve. Pt is awake and alert at this time.      68-year-old male presents via EMS to [facility] ER status post syncopal episode associated with RUE weakness.  Patient reports being in his usual state of health earlier today.  Around an hour and a half prior to ER arrival, patient was seated on his couch when he became lethargic.  Per family member, he appeared pale and was also red in the face and clammy.  Patient became unresponsive.  Family member breathed in his mouth and tried rubbing on his chest.  Patient gradually roused, but was very confused.  He had slurred speech and inappropriate answers to questions.  The patient then noted weakness and heaviness of his right upper extremity.  He was unable to push himself up with his right arm or lift his right arm up.  Family member called 911.  EMS found patient hypotensive with pinpoint pupils; BP was 70s/x.  EMS administered IV narcan 2mg with improvement in BP and mentation.  Right arm weakness has resolved.  However, patient reports some tingling in his fingers which persists.    Patient saw PMD Dr. Dino Horner earlier today and was rx'ed his usual pain medication: Norco 10/325mg PO q4-6hrs PRN pain and Gabapentin 600mg PO TID.  Patient had been attempting to stretch his pain medication because he was running low until this appointment.  He did take a Norco 10/325mg this morning and another this afternoon after his appointment and before onset of AMS.  However, patient denies inappropriate use of meds.      PMH: HTN, chronic neck and back pain  PSH: neck surgery        Review  of patient's allergies indicates:  No Known Allergies  Past Medical History:   Diagnosis Date    Arthritis      Past Surgical History:   Procedure Laterality Date    arm surgury      CARPAL TUNNEL RELEASE      neck surgury      TIBIA FRACTURE SURGERY      ULNAR NERVE TRANSPOSITION       History reviewed. No pertinent family history.  Social History     Tobacco Use    Smoking status: Former     Current packs/day: 0.00     Types: Cigarettes     Quit date: 2017     Years since quittin.7    Smokeless tobacco: Former   Substance Use Topics    Alcohol use: Yes     Alcohol/week: 5.0 standard drinks of alcohol     Types: 5 Cans of beer per week    Drug use: No     Review of Systems   Constitutional:  Positive for diaphoresis.   Eyes:  Negative for photophobia.   Respiratory:  Negative for cough.    Cardiovascular:  Negative for chest pain.   Gastrointestinal:  Negative for vomiting.   Genitourinary:  Negative for flank pain.   Musculoskeletal:  Positive for back pain (chronic) and neck pain (chronic).   Skin:  Positive for pallor.   Neurological:  Positive for weakness (RUE).   Psychiatric/Behavioral:  Positive for confusion.        Physical Exam     Initial Vitals   BP Pulse Resp Temp SpO2   24 2107 24 2107 24 21024 21024   (!) 100/53 65 18 98.7 °F (37.1 °C) 100 %      MAP       --                Physical Exam    Nursing note and vitals reviewed.  Constitutional: He appears well-developed and well-nourished. He is not diaphoretic.   Awake, alert. Speaking in complete sentences.   HENT:   Head: Normocephalic and atraumatic.   Eyes: Conjunctivae and EOM are normal. Pupils are equal, round, and reactive to light.   Neck: Neck supple.   Normal range of motion.  Cardiovascular:  Normal rate, regular rhythm and intact distal pulses.           Pulmonary/Chest: Breath sounds normal. No respiratory distress. He has no wheezes. He has no rhonchi. He has no rales.   Abdominal: Abdomen  is soft. There is no abdominal tenderness.   Musculoskeletal:         General: No tenderness or edema. Normal range of motion.      Cervical back: Normal range of motion and neck supple.     Neurological: He is alert and oriented to person, place, and time. He has normal strength. No cranial nerve deficit.   Moving all extremities. Finger-nose with some difficulty bilaterally. No pronator drift.   Skin: Skin is warm and dry.   Psychiatric: His behavior is normal.         ED Course   Procedures  Labs Reviewed   CBC W/ AUTO DIFFERENTIAL - Abnormal; Notable for the following components:       Result Value    RBC 3.43 (*)     Hemoglobin 11.1 (*)     Hematocrit 32.5 (*)     MCH 32.4 (*)     MPV 9.1 (*)     Gran # (ANC) 8.6 (*)     Gran % 77.4 (*)     Lymph % 12.0 (*)     All other components within normal limits    Narrative:     Release to patient->Immediate   COMPREHENSIVE METABOLIC PANEL - Abnormal; Notable for the following components:    Sodium 130 (*)     Calcium 8.2 (*)     Alkaline Phosphatase 46 (*)     Anion Gap 7 (*)     All other components within normal limits    Narrative:     Release to patient->Immediate   LIPID PANEL - Abnormal; Notable for the following components:    Triglycerides 166 (*)     LDL Cholesterol 53.8 (*)     All other components within normal limits    Narrative:     Release to patient->Immediate   DRUG SCREEN PANEL, URINE EMERGENCY - Abnormal; Notable for the following components:    Opiate Scrn, Ur Presumptive Positive (*)     THC Presumptive Positive (*)     All other components within normal limits    Narrative:     Specimen Source->Urine   COMPREHENSIVE METABOLIC PANEL - Abnormal; Notable for the following components:    Sodium 130 (*)     Calcium 8.3 (*)     Total Protein 5.5 (*)     Albumin 3.2 (*)     Alkaline Phosphatase 41 (*)     Anion Gap 4 (*)     All other components within normal limits   CBC W/ AUTO DIFFERENTIAL - Abnormal; Notable for the following components:    RBC 3.32  (*)     Hemoglobin 10.5 (*)     Hematocrit 31.0 (*)     MCH 31.6 (*)     MPV 8.8 (*)     All other components within normal limits   POCT GLUCOSE - Abnormal; Notable for the following components:    POCT Glucose 116 (*)     All other components within normal limits   PROTIME-INR    Narrative:     Release to patient->Immediate   TSH    Narrative:     Release to patient->Immediate   TROPONIN I    Narrative:     Release to patient->Immediate   B-TYPE NATRIURETIC PEPTIDE    Narrative:     Release to patient->Immediate   URINALYSIS, REFLEX TO URINE CULTURE    Narrative:     Specimen Source->Urine   ALCOHOL,MEDICAL (ETHANOL)    Narrative:     Release to patient->Immediate   RAPID HIV    Narrative:     Release to patient->Immediate   MAGNESIUM   PHOSPHORUS   TROPONIN I   HEPATITIS C ANTIBODY   HEMOGLOBIN A1C   POCT GLUCOSE, HAND-HELD DEVICE     EKG Readings: (Independently Interpreted)   22:07: NSR, HR 65. Normal axis. Normal intervals. No ectopy. No STEMI.       Imaging Results              X-Ray Chest AP Portable (Final result)  Result time 02/26/24 23:26:07      Final result by Wilfredo Rodriguez MD (02/26/24 23:26:07)                   Impression:      No acute cardiopulmonary process identified.      Electronically signed by: Wilfredo Rodriguez MD  Date:    02/26/2024  Time:    23:26               Narrative:    EXAMINATION:  XR CHEST AP PORTABLE    CLINICAL HISTORY:  Altered mental status, unspecified    TECHNIQUE:  Single frontal view of the chest was performed.    COMPARISON:  None    FINDINGS:  Cardiac silhouette is normal in size.  Lungs are symmetrically expanded.  No evidence of focal consolidative process, pneumothorax, or significant pleural effusion.  No acute osseous abnormality identified.                                       CT Head Without Contrast (Final result)  Result time 02/26/24 22:41:26      Final result by Wilfredo Rodriguez MD (02/26/24 22:41:26)                   Impression:      No acute  intracranial abnormalities identified.      Electronically signed by: Wilfredo Rodriguez MD  Date:    02/26/2024  Time:    22:41               Narrative:    EXAMINATION:  CT HEAD WITHOUT CONTRAST    CLINICAL HISTORY:  Neuro deficit, acute, stroke suspected;    TECHNIQUE:  Low dose axial images were obtained through the head.  Coronal and sagittal reformations were also performed. Contrast was not administered.    COMPARISON:  CT head from February 2016.    FINDINGS:  There is mild chronic microvascular ischemic change.  No evidence of acute/recent major vascular distribution cerebral infarction, intraparenchymal hemorrhage, or intra-axial space occupying lesion. The ventricular system is normal in size and configuration with no evidence of hydrocephalus. No effacement of the skull-base cisterns. No abnormal extra-axial fluid collections or blood products.  Patchy ethmoid sinus disease is noted.  Remaining visualized paranasal sinuses and mastoid air cells are essentially clear.  The calvarium shows no significant abnormality.                                       Medications   sodium chloride 0.9% flush 10 mL (has no administration in time range)   sodium chloride 0.9% bolus 500 mL 500 mL (has no administration in time range)   labetaloL injection 10 mg (has no administration in time range)   bisacodyL suppository 10 mg (has no administration in time range)   atorvastatin tablet 40 mg (has no administration in time range)   ondansetron injection 4 mg (has no administration in time range)   senna-docusate 8.6-50 mg per tablet 1 tablet (has no administration in time range)   aspirin EC tablet 81 mg (has no administration in time range)   sodium chloride 0.9% bolus 1,000 mL 1,000 mL (0 mLs Intravenous Stopped 2/26/24 6662)   aspirin tablet 325 mg (325 mg Oral Given 2/27/24 0016)     Medical Decision Making  69 yo male s/p episode of unresponsiveness, clamminess, pallor, followed by confusion and slurred speech, followed by  RUE weakness.  Confusion and RUE weakness have resolved.  Patient is chronically on Norco 10/325mg but states he did not take more than his usual dose; however mentation and deficits improved with Narcan via EMS.      Ddx includes polypharmacy / opiate overdose, CVA, TIA, electrolyte derangement, occult infection, other.    EKG no STEMI.     CT head NAD.     Labs show Na 130, otherwise reassuring.     Patient is awake/alert here and remains without focal neuro deficit. BP now 119/55 s/p IV NS 1L by EMS; he is receiving a second NS 1L now.     Polypharmacy/accidental opiate OD could have caused hypotension, AMS, and even focal deficits, but patient denies taking more meds than he is rx'ed.  Patient does require further workup to r/o CVA/TIA including MRI brain.  (Patient has metal in his LLE but has had MRIs in the past.)    I discussed patient via [employer] Secure Chat with case management RN Sandrita Velez, [facility] medicine Dr. Umang Marquez, and OBS PA Irlanda Aparicio.      I updated patient as to plan for OBS and he is amenable.  BP improved s/p fluids, 119/55; HR 62.      Amount and/or Complexity of Data Reviewed  Labs: ordered.  Radiology: ordered.    Risk  OTC drugs.                                      Clinical Impression:  Final diagnoses:  [R29.818] Acute focal neurological deficit  [R41.82] Altered mental status (Primary)  [R29.898] Right arm weakness  [R55] Syncope, unspecified syncope type  [E87.1] Hyponatremia  [G45.9] TIA (transient ischemic attack)          ED Disposition Condition    Observation Stable                Lexis Ryan MD  02/27/24 1067

## 2024-02-27 NOTE — ED NOTES
Patient understands urine specimen is needed. Urinal at bedside. Verbalized understanding to use call light once specimen is obtained. Care ongoing.

## 2024-02-27 NOTE — PT/OT/SLP EVAL
Occupational Therapy   Evaluation and Discharge Note    Name: Prasanna Scott  MRN: [id number]  Admitting Diagnosis: Arm numbness  Recent Surgery: * No surgery found *      Recommendations:     Discharge Recommendations: No Therapy Indicated  Discharge Equipment Recommendations: none  Barriers to discharge:  None    Assessment:     Prasanna Scott is a 68 y.o. male with a medical diagnosis of Arm numbness. At this time, patient is functioning at their prior level of function and does not require further acute OT services.     Plan:     During this hospitalization, patient does not require further acute OT services.  Please re-consult if situation changes.    Plan of Care Reviewed with: patient    Subjective     Chief Complaint: anxious to go home  Patient/Family Comments/goals: go home to see his girlfriend    Occupational Profile:  Living Environment: lives alone in a 1st floor apt  Previous level of function: (I) with self care and uses a SC, prn for amb  Roles and Routines: drives, retired from floor work  Equipment Used at home: cane, straight, shower chair  Assistance upon Discharge: girl friend?    Pain/Comfort:  Pain Rating 1:  (7-8/10 chronic pain)  Pain Addressed 1: Pre-medicate for activity, Distraction, Cessation of Activity    Patients cultural, spiritual, Hoahaoism conflicts given the current situation: no    Objective:     Communicated with: nurseJonathan prior to session.  Patient found HOB elevated with blood pressure cuff, peripheral IV, pulse ox (continuous), telemetry upon OT entry to room.    General Precautions: Standard, fall  Orthopedic Precautions: N/A  Braces: N/A  Respiratory Status: Room air     Occupational Performance:    Bed Mobility:    Patient completed Scooting/Bridging with modified independence  Patient completed Supine to Sit with modified independence  Patient completed Sit to Supine with modified independence    Functional Mobility/Transfers:  Patient completed Sit <> Stand  Transfer with modified independence  with  no assistive device   Functional Mobility: The patient amb without AD to the toilet, mod I. The patient was able to stand at the toilet and sink with no LOB.    Activities of Daily Living:  Grooming: modified independence to stand at the sink to wash hands  Upper Body Dressing: contact guard assistance to don back gown  Lower Body Dressing: modified independence to don B shoes while seated on the EOB  Toileting: modified independence and supervision to stand and urinate at the toilet    Cognitive/Visual Perceptual:  Cognitive/Psychosocial Skills:     -       Oriented to: Situation   -       Follows Commands/attention:Follows multistep  commands  -       Communication: clear/fluent  -       Memory: No Deficits noted  -       Safety awareness/insight to disability: intact   -       Mood/Affect/Coping skills/emotional control: Appropriate to situation  Visual/Perceptual:      -Intact      Physical Exam:  Balance: -       good  Postural examination/scapula alignment:    -       No postural abnormalities identified  Skin integrity: Visible skin intact  Edema:  None noted  Sensation:    -       Intact  Dominant hand: -       right  Upper Extremity Range of Motion:     -       Right Upper Extremity: WFL  -       Left Upper Extremity: WFL  Upper Extremity Strength:    -       Right Upper Extremity: WFL  -       Left Upper Extremity: WFL   Strength:    -       Right Upper Extremity: WFL  -       Left Upper Extremity:  slightly limited by limited ROM  Fine Motor Coordination:    -       Intact in right rand, left hand limited by decreased ROM in index finger    AMPAC 6 Click ADL:  AMPAC Total Score: 24    Treatment & Education:  OT melinda  Educated the patient re: OT role and POC    Patient left HOB elevated with all lines intact, call button in reach, and nurseJonathan notified    GOALS:   Multidisciplinary Problems       Occupational Therapy Goals       Not on file               Multidisciplinary Problems (Resolved)          Problem: Occupational Therapy    Goal Priority Disciplines Outcome Interventions   Occupational Therapy Goal   (Resolved)     OT, PT/OT Met                        History:     Past Medical History:   Diagnosis Date    Arthritis          Past Surgical History:   Procedure Laterality Date    arm surgury      CARPAL TUNNEL RELEASE      neck surgury      TIBIA FRACTURE SURGERY      ULNAR NERVE TRANSPOSITION         Time Tracking:     OT Date of Treatment: 02/27/24  OT Start Time: 0941  OT Stop Time: 1000  OT Total Time (min): 19 min    Billable Minutes:Evaluation 19 (with PT)    2/27/2024

## 2024-02-27 NOTE — CARE UPDATE
68 y.o. year old male with a past medical history of hypertension, BPH, GERD, tobacco use presented to the ED yesterday for right arm weakness.     Reviewed H&P, vital signs, and labs.  Patient seen and examined.    Echo:     Left Ventricle: There is normal systolic function with a visually estimated ejection fraction of 55 - 60%. There is normal diastolic function.    Right Ventricle: Normal right ventricular cavity size. Systolic function is normal.    Aorta: Aortic root is mildly dilated measuring 3.87 cm. Ascending aorta is mildly dilated measuring 3.66 cm.    No intracardiac source of embolus noted on this exam.  If high clinical suspicion, consider MICHAEL +/- bubble study.    Resume BP medication with parameters. Added orthostatic BP bid        Neurology recs:   -MRI brain w/o contrast pending   -CTA head and neck pending   - starting on aspirin 81 mg daily  - after loading with ASA 325mg and Plavix 300mg. Daily aspirin 81 mg /  clopidogrel 75 mg x 21 days followed by ASA 81mg OD monotherapy therafter.  If intracranial HGS noted on CTA head, continue DAPT for 3 months.  - start on Lipitor 20 mg.  LDL at goal  -Holter monitor at discharge  -maintain normotensive blood pressure, long term < 140/90   -Goal Parameters for TIA/stroke: LDL<70 mg/dl, HbA1C: <7.0 %,  SBP<130/80 (discussed risk factor optimization in order to reduce the risk of future events with help of PCP)  -PT/OT evaluation and therapy goals per their recommendations  -Diet and Exercise: Discussed aggressive lifestyle modification. Eat primarily plant-based foods, such as fruits and vegetables, whole grains, legumes (beans) and nuts. Discussed Mediterranean diet. Daily exercise (150 minutes per week).  -Provided education regarding future stroke identification: I went over the usual stroke warning signs and symptoms, and the need to activate EMS (call 911) as soon as the symptoms present including-sudden onset numbness or weakness of the face, arm, or  leg; especially on one side of the body; sudden confusion, trouble speaking, or understanding; sudden trouble seeing in one or both eyes; sudden trouble walking; dizziness; loss of coordination.   - follow up in stroke Neurology Clinic as outpatient  - patient will need follow up with Cardiology for further evaluation of hypotension and syncopal episodes

## 2024-02-27 NOTE — HPI
"This is a 68-year-old male with a past medical history of hypertension, BPH, GERD, tobacco use who presents the hospital via EMS for right arm weakness and numbness from earlier tonight. Patient states that he was reclining back in the couch when he realized he could not move his right arm and felt really "heavy".  Per EMS, patient was found hypotensive with pinpoint pupils but awake and alert. His systolic blood pressure was 70 and he was given Narcan 2 g IV push when his symptoms improve. Patient states that his girlfriend thought he seemed confused and altered as he was speaking "gibberish".  Patient states that he has a history of cervical spine surgery and is symptoms could be attributed to that.  Patient denies using any illicit substances.  He denies any other alleviating or exacerbating factors. Patient denies headache, fever, chills, chest pain, shortness on breath, nausea, vomiting, diarrhea, or any other associated symptoms.    In the ED, the patient was hemodynamically stable. Labs were remarkable for 2 normocytic anemia (11.1), hyponatremia (130).  UDS was positive for opioids and marijuana. Chest x-ray was negative for acute process.  CT head showed no acute intracranial abnormality.  Patient was given 1 L of NS, aspirin 325 mg.      Case discussed with ED provider.    Prasanna Scott was placed under observation for further medical management.  " Digestive

## 2024-02-28 VITALS
HEIGHT: 72 IN | WEIGHT: 165 LBS | RESPIRATION RATE: 18 BRPM | OXYGEN SATURATION: 98 % | BODY MASS INDEX: 22.35 KG/M2 | HEART RATE: 68 BPM | SYSTOLIC BLOOD PRESSURE: 157 MMHG | TEMPERATURE: 98 F | DIASTOLIC BLOOD PRESSURE: 78 MMHG

## 2024-02-28 LAB
ALBUMIN SERPL BCP-MCNC: 3.9 G/DL (ref 3.5–5.2)
ALP SERPL-CCNC: 53 U/L (ref 55–135)
ALT SERPL W/O P-5'-P-CCNC: 22 U/L (ref 10–44)
ANION GAP SERPL CALC-SCNC: 6 MMOL/L (ref 8–16)
AST SERPL-CCNC: 23 U/L (ref 10–40)
BASOPHILS # BLD AUTO: 0.05 K/UL (ref 0–0.2)
BASOPHILS NFR BLD: 0.7 % (ref 0–1.9)
BILIRUB SERPL-MCNC: <0.1 MG/DL (ref 0.1–1)
BUN SERPL-MCNC: 15 MG/DL (ref 8–23)
CALCIUM SERPL-MCNC: 8.7 MG/DL (ref 8.7–10.5)
CHLORIDE SERPL-SCNC: 101 MMOL/L (ref 95–110)
CO2 SERPL-SCNC: 24 MMOL/L (ref 23–29)
CREAT SERPL-MCNC: 0.8 MG/DL (ref 0.5–1.4)
DIFFERENTIAL METHOD BLD: ABNORMAL
EOSINOPHIL # BLD AUTO: 0.3 K/UL (ref 0–0.5)
EOSINOPHIL NFR BLD: 5 % (ref 0–8)
ERYTHROCYTE [DISTWIDTH] IN BLOOD BY AUTOMATED COUNT: 11.9 % (ref 11.5–14.5)
EST. GFR  (NO RACE VARIABLE): >60 ML/MIN/1.73 M^2
GLUCOSE SERPL-MCNC: 97 MG/DL (ref 70–110)
HCT VFR BLD AUTO: 37.9 % (ref 40–54)
HGB BLD-MCNC: 12.5 G/DL (ref 14–18)
IMM GRANULOCYTES # BLD AUTO: 0.02 K/UL (ref 0–0.04)
IMM GRANULOCYTES NFR BLD AUTO: 0.3 % (ref 0–0.5)
LYMPHOCYTES # BLD AUTO: 2.3 K/UL (ref 1–4.8)
LYMPHOCYTES NFR BLD: 34.2 % (ref 18–48)
MCH RBC QN AUTO: 31.6 PG (ref 27–31)
MCHC RBC AUTO-ENTMCNC: 33 G/DL (ref 32–36)
MCV RBC AUTO: 96 FL (ref 82–98)
MONOCYTES # BLD AUTO: 0.7 K/UL (ref 0.3–1)
MONOCYTES NFR BLD: 10.9 % (ref 4–15)
NEUTROPHILS # BLD AUTO: 3.3 K/UL (ref 1.8–7.7)
NEUTROPHILS NFR BLD: 48.9 % (ref 38–73)
NRBC BLD-RTO: 0 /100 WBC
PLATELET # BLD AUTO: 286 K/UL (ref 150–450)
PMV BLD AUTO: 9.3 FL (ref 9.2–12.9)
POTASSIUM SERPL-SCNC: 4.8 MMOL/L (ref 3.5–5.1)
PROT SERPL-MCNC: 6.7 G/DL (ref 6–8.4)
RBC # BLD AUTO: 3.96 M/UL (ref 4.6–6.2)
SODIUM SERPL-SCNC: 131 MMOL/L (ref 136–145)
WBC # BLD AUTO: 6.78 K/UL (ref 3.9–12.7)

## 2024-02-28 PROCEDURE — 80053 COMPREHEN METABOLIC PANEL: CPT

## 2024-02-28 PROCEDURE — 36415 COLL VENOUS BLD VENIPUNCTURE: CPT

## 2024-02-28 PROCEDURE — 25000003 PHARM REV CODE 250

## 2024-02-28 PROCEDURE — 25000003 PHARM REV CODE 250: Performed by: NURSE PRACTITIONER

## 2024-02-28 PROCEDURE — 85025 COMPLETE CBC W/AUTO DIFF WBC: CPT

## 2024-02-28 PROCEDURE — 25000003 PHARM REV CODE 250: Performed by: HOSPITALIST

## 2024-02-28 RX ORDER — ASPIRIN 81 MG/1
81 TABLET ORAL DAILY
Qty: 90 TABLET | Refills: 2 | Status: SHIPPED | OUTPATIENT
Start: 2024-02-29 | End: 2025-02-28

## 2024-02-28 RX ORDER — ATORVASTATIN CALCIUM 20 MG/1
20 TABLET, FILM COATED ORAL NIGHTLY
Qty: 90 TABLET | Refills: 3 | Status: SHIPPED | OUTPATIENT
Start: 2024-02-28 | End: 2025-02-27

## 2024-02-28 RX ORDER — CLOPIDOGREL BISULFATE 75 MG/1
75 TABLET ORAL DAILY
Qty: 19 TABLET | Refills: 0 | Status: SHIPPED | OUTPATIENT
Start: 2024-02-29 | End: 2025-02-28

## 2024-02-28 RX ADMIN — ASPIRIN 81 MG: 81 TABLET, COATED ORAL at 08:02

## 2024-02-28 RX ADMIN — CLOPIDOGREL BISULFATE 75 MG: 75 TABLET ORAL at 08:02

## 2024-02-28 RX ADMIN — POLYETHYLENE GLYCOL 3350 17 G: 17 POWDER, FOR SOLUTION ORAL at 08:02

## 2024-02-28 RX ADMIN — DOCUSATE SODIUM 50MG AND SENNOSIDES 8.6MG 1 TABLET: 8.6; 5 TABLET, FILM COATED ORAL at 04:02

## 2024-02-28 RX ADMIN — METOPROLOL TARTRATE 25 MG: 25 TABLET, FILM COATED ORAL at 08:02

## 2024-02-28 RX ADMIN — GABAPENTIN 600 MG: 300 CAPSULE ORAL at 08:02

## 2024-02-28 RX ADMIN — VALSARTAN 320 MG: 80 TABLET, FILM COATED ORAL at 08:02

## 2024-02-28 RX ADMIN — HYDROCODONE BITARTRATE AND ACETAMINOPHEN 1 TABLET: 10; 325 TABLET ORAL at 08:02

## 2024-02-28 NOTE — DISCHARGE SUMMARY
"[facility] Medicine  Discharge Summary      Patient Name: Prasanna Scott  MRN: [id number]  DIANDRA: [id number]  Patient Class: IP- Inpatient  Admission Date: 2/26/2024  Hospital Length of Stay: 1 days  Discharge Date and Time:  02/28/2024 11:37 AM  Attending Physician: Rafael Barr MD   Discharging Provider: Justen Draper NP  Primary Care Provider: Ruthy Jean Baptiste [facility]    Primary Care Team: Networked reference to record PCT     HPI:   This is a 68-year-old male with a past medical history of hypertension, BPH, GERD, tobacco use who presents the hospital via EMS for right arm weakness and numbness from earlier tonight. Patient states that he was reclining back in the couch when he realized he could not move his right arm and felt really "heavy".  Per EMS, patient was found hypotensive with pinpoint pupils but awake and alert. His systolic blood pressure was 70 and he was given Narcan 2 g IV push when his symptoms improve. Patient states that his girlfriend thought he seemed confused and altered as he was speaking "gibberish".  Patient states that he has a history of cervical spine surgery and is symptoms could be attributed to that.  Patient denies using any illicit substances.  He denies any other alleviating or exacerbating factors. Patient denies headache, fever, chills, chest pain, shortness on breath, nausea, vomiting, diarrhea, or any other associated symptoms.    In the ED, the patient was hemodynamically stable. Labs were remarkable for 2 normocytic anemia (11.1), hyponatremia (130).  UDS was positive for opioids and marijuana. Chest x-ray was negative for acute process.  CT head showed no acute intracranial abnormality.  Patient was given 1 L of NS, aspirin 325 mg.      Case discussed with ED provider.    Prasanna Scott was placed under observation for further medical management.    * No surgery found *      Hospital Course:   68 y.o. year old male with a past medical history " of hypertension, BPH, GERD, tobacco use presented to the ED yesterday for right arm weakness. UDS was positive for opiates and marijuana. Blood work remarkable for sodium 130. CT head was obtained with no abnormality consulted neuro: recs asa daily. after loading with ASA 325mg and Plavix 300mg. Daily aspirin 81 mg /  clopidogrel 75 mg x 21 days followed by ASA 81mg OD monotherapy therafter. If intracranial HGS noted on CTA head, continue DAPT for 3 months. start on Lipitor 20 mg.  LDL at goal. Holter monitor at discharge. follow up in stroke Neurology Clinic as outpatient. patient will need follow up with Cardiology for further evaluation of hypotension and syncopal episodes. MRI brain No acute intracranial abnormality. Unremarkable CTA head and neck. No large vessel occlusion or high-grade stenosis. PT/OT/ST no needs.      Echo:     Left Ventricle: There is normal systolic function with a visually estimated ejection fraction of 55 - 60%. There is normal diastolic function.    Right Ventricle: Normal right ventricular cavity size. Systolic function is normal.    Aorta: Aortic root is mildly dilated measuring 3.87 cm. Ascending aorta is mildly dilated measuring 3.66 cm.    No intracardiac source of embolus noted on this exam.  If high clinical suspicion, consider MICHAEL +/- bubble study.                Goals of Care Treatment Preferences:  Code Status: Full Code      Consults:   Consults (From admission, onward)          Status Ordering Provider     Inpatient consult to Registered Dietitian/Nutritionist  Once        Provider:  (Not yet assigned)    Completed MAYNOR LAMBERT     IP consult to case management/social work  Once        Provider:  (Not yet assigned)    Completed MAYNOR LAMBERT     Inpatient consult to Neurology  Once        Provider:  Diana Newell MD    Completed MAYNOR LAMBERT            Cardiac/Vascular  Hypertension  Chronic, controlled. Latest blood pressure and vitals reviewed-     Temp:  [98.7 °F  (37.1 °C)-98.9 °F (37.2 °C)]   Pulse:  [62-74]   Resp:  [13-20]   BP: ()/(49-61)   SpO2:  [97 %-100 %] .   Home meds for hypertension were reviewed and noted below.   Hypertension Medications               amLODIPine (NORVASC) 10 MG tablet amlodipine 10 mg tablet   TAKE 1 TABLET BY MOUTH EVERY DAY    metoprolol tartrate (LOPRESSOR) 25 MG tablet metoprolol tartrate 25 mg tablet   TAKE 1 TABLET BY MOUTH TWICE DAILY    olmesartan (BENICAR) 20 MG tablet Take 20 mg by mouth.            While in the hospital, will manage blood pressure as follows; Adjust home antihypertensive regimen as follows- hold home blood pressure medications pending MRI brain and for permissive hypertension    Will utilize p.r.n. blood pressure medication only if patient's blood pressure greater than 220/110 and he develops symptoms such as worsening chest pain or shortness of breath.      Final Active Diagnoses:    Diagnosis Date Noted POA    PRINCIPAL PROBLEM:  Arm numbness [R20.0] 02/27/2024 Unknown    Unintentional opioid overdose [T40.2X1A] 02/27/2024 Unknown    Hypertension [I10] 02/27/2024 Yes      Problems Resolved During this Admission:       Discharged Condition: stable    Disposition: Home or Self Care    Follow Up:   Follow-up Information       Bambi Yang MD Follow up.    Specialties: Internal Medicine, Pediatrics  Contact information:  [address]  [address]  [facility] [address]  [phone]                           Patient Instructions:      Ambulatory referral/consult to Cardiology   Standing Status: Future   Referral Priority: Routine Referral Type: Consultation   Referral Reason: Specialty Services Required   Requested Specialty: Cardiology   Number of Visits Requested: 1     Ambulatory referral/consult to Neurology   Standing Status: Future   Referral Priority: Routine Referral Type: Consultation   Referral Reason: Specialty Services Required   Requested Specialty: Neurology   Number of Visits Requested:  1     Holter monitor - 48 hour   Standing Status: Future Standing Exp. Date: 02/28/25     Order Specific Question Answer Comments   Release to patient Immediate        Significant Diagnostic Studies: Labs: BMP:   Recent Labs   Lab 02/26/24 2209 02/27/24  0502 02/28/24  0435    89 97   * 130* 131*   K 4.6 4.7 4.8   CL 99 102 101   CO2 24 24 24   BUN 14 12 15   CREATININE 1.2 0.8 0.8   CALCIUM 8.2* 8.3* 8.7   MG  --  1.8  --    , CMP   Recent Labs   Lab 02/26/24 2209 02/27/24  0502 02/28/24  0435   * 130* 131*   K 4.6 4.7 4.8   CL 99 102 101   CO2 24 24 24    89 97   BUN 14 12 15   CREATININE 1.2 0.8 0.8   CALCIUM 8.2* 8.3* 8.7   PROT 6.2 5.5* 6.7   ALBUMIN 3.6 3.2* 3.9   BILITOT 0.3 0.3 <0.1*   ALKPHOS 46* 41* 53*   AST 25 24 23   ALT 23 21 22   ANIONGAP 7* 4* 6*   , CBC   Recent Labs   Lab 02/26/24 2209 02/27/24 0502 02/28/24 0435   WBC 11.12 8.19 6.78   HGB 11.1* 10.5* 12.5*   HCT 32.5* 31.0* 37.9*    207 286   , INR   Lab Results   Component Value Date    INR 1.0 02/26/2024   , Lipid Panel   Lab Results   Component Value Date    CHOL 128 02/26/2024    HDL 41 02/26/2024    LDLCALC 53.8 (L) 02/26/2024    TRIG 166 (H) 02/26/2024    CHOLHDL 32.0 02/26/2024   , Troponin   Recent Labs   Lab 02/26/24 2209 02/27/24 0502   TROPONINI <0.006 <0.006   , A1C:   Recent Labs   Lab 02/27/24 0502   HGBA1C 5.7*   , and All labs within the past 24 hours have been reviewed    Pending Diagnostic Studies:       None           Medications:  Reconciled Home Medications:      Medication List        START taking these medications      aspirin 81 MG EC tablet  Commonly known as: ECOTRIN  Take 1 tablet (81 mg total) by mouth once daily.  Start taking on: February 29, 2024     atorvastatin 20 MG tablet  Commonly known as: LIPITOR  Take 1 tablet (20 mg total) by mouth every evening.     clopidogreL 75 mg tablet  Commonly known as: PLAVIX  Take 1 tablet (75 mg total) by mouth once daily.  Start taking  on: February 29, 2024            CONTINUE taking these medications      ergocalciferol 50,000 unit Cap  Commonly known as: ERGOCALCIFEROL  Take 1 capsule by mouth every 7 days.     gabapentin 600 MG tablet  Commonly known as: NEURONTIN  Take 1 tablet by mouth 3 (three) times daily.     HYDROcodone-acetaminophen  mg per tablet  Commonly known as: NORCO  TAKE 1 TABLET BY MOUTH FIVE TIMES DAILY AS NEEDED FOR PAIN     metoprolol tartrate 25 MG tablet  Commonly known as: LOPRESSOR  metoprolol tartrate 25 mg tablet   TAKE 1 TABLET BY MOUTH TWICE DAILY     olmesartan 40 MG tablet  Commonly known as: BENICAR  Take 40 mg by mouth once daily.            STOP taking these medications      amLODIPine 10 MG tablet  Commonly known as: NORVASC     tadalafiL 10 MG tablet  Commonly known as: CIALIS              Indwelling Lines/Drains at time of discharge:   Lines/Drains/Airways       None                   Time spent on the discharge of patient: 90 minutes         Justen Draper NP  Department of Hospital Medicine  [facility] [place] - Telemetry

## 2024-02-28 NOTE — NURSING
Patient is discharged on room air, no distress noted. Tele and IV removed with tip intact. Instructions reviewed with patient. Questions asked answered. Prescriptions delivered. Waiting for wheelchair downstairs.

## 2024-02-28 NOTE — HOSPITAL COURSE
68 y.o. year old male with a past medical history of hypertension, BPH, GERD, tobacco use presented to the ED yesterday for right arm weakness. UDS was positive for opiates and marijuana. Blood work remarkable for sodium 130. CT head was obtained with no abnormality consulted neuro: recs asa daily. after loading with ASA 325mg and Plavix 300mg. Daily aspirin 81 mg /  clopidogrel 75 mg x 21 days followed by ASA 81mg OD monotherapy therafter. If intracranial HGS noted on CTA head, continue DAPT for 3 months. start on Lipitor 20 mg.  LDL at goal. Holter monitor at discharge. follow up in stroke Neurology Clinic as outpatient. patient will need follow up with Cardiology for further evaluation of hypotension and syncopal episodes. MRI brain No acute intracranial abnormality. Unremarkable CTA head and neck. No large vessel occlusion or high-grade stenosis. PT/OT/ST no needs.      Echo:     Left Ventricle: There is normal systolic function with a visually estimated ejection fraction of 55 - 60%. There is normal diastolic function.    Right Ventricle: Normal right ventricular cavity size. Systolic function is normal.    Aorta: Aortic root is mildly dilated measuring 3.87 cm. Ascending aorta is mildly dilated measuring 3.66 cm.    No intracardiac source of embolus noted on this exam.  If high clinical suspicion, consider MICHAEL +/- bubble study.

## 2024-02-28 NOTE — PLAN OF CARE
02/28/24 1254   Final Note   Assessment Type Final Discharge Note   Anticipated Discharge Disposition Home   Hospital Resources/Appts/Education Provided Appointments scheduled and added to AVS   Post-Acute Status   Post-Acute Authorization Other   Other Status No Post-Acute Service Needs     Pts nurse Anisha notified that the pt can d/c from CM standpoint

## 2024-02-28 NOTE — NURSING
[facility]  Nurses Note -- 4 Eyes      2/27/2024       Skin assessed on: Q Shift      [x] No Pressure Injuries Present    [x]Prevention Measures Documented    [] Yes LDA  for Pressure Injury Previously documented     [] Yes New Pressure Injury Discovered   [] LDA for New Pressure Injury Added      Attending RN:  YONAS WALTERS, RN     Second RN:  Anisha MASTERS

## 2024-03-01 ENCOUNTER — TELEPHONE (OUTPATIENT)
Dept: NEUROLOGY | Facility: CLINIC | Age: 69
End: 2024-03-01
Payer: COMMERCIAL

## 2024-03-01 NOTE — TELEPHONE ENCOUNTER
----- Message from Amalia Camargo sent at 3/1/2024  1:32 PM CST -----  Type:  Sooner Apoointment Request    Caller is requesting a sooner appointment.  Caller declined first available appointment listed below.  Caller will not accept being placed on the waitlist and is requesting a message be sent to doctor.  Name of Caller: Pt  When is the first available appointment?  Symptoms: referral  Would the patient rather a call back or a [facility]? Call  Best Call Back Number: [phone]  Additional Information: Pt states he can not be seen prior to March 25, 2024.

## 2024-03-18 ENCOUNTER — PATIENT OUTREACH (OUTPATIENT)
Dept: ADMINISTRATIVE | Facility: OTHER | Age: 69
End: 2024-03-18

## 2024-04-23 ENCOUNTER — TELEPHONE (OUTPATIENT)
Dept: ENDOSCOPY | Facility: HOSPITAL | Age: 69
End: 2024-04-23
Payer: COMMERCIAL

## 2024-04-23 ENCOUNTER — TELEPHONE (OUTPATIENT)
Dept: UROLOGY | Facility: CLINIC | Age: 69
End: 2024-04-23
Payer: COMMERCIAL

## 2024-04-23 DIAGNOSIS — Z86.010 PERSONAL HISTORY OF COLONIC POLYPS: Primary | ICD-10-CM

## 2024-04-23 NOTE — TELEPHONE ENCOUNTER
Spoke to Francisco to schedule procedure(s) Colonoscopy       Physician to perform procedure(s) Dr. JOSE Hayes   Date of Procedure (s) 07/05/2024  Arrival Time 7:15 Am   Time of Procedure(s) 8:15 AM    [place] [facility] [place] [facility] [place]Enter at the rear of the building through the emergency department screening station or the Outpatient Registration door, then continue to endoscopy department on the 2nd floor.    Type of Rx Prep sent to patient: PEG  Instructions provided to patient via [facility]    Patient was informed on the following information and verbalized understanding. Screening questionnaire reviewed with patient and complete. If procedure requires anesthesia, a responsible adult needs to be present to accompany the patient home, patient cannot drive after receiving anesthesia. Appointment details are tentative, especially check-in time. Patient will receive a prep-op call 7 days prior to confirm check-in time for procedure. If applicable the patient should contact their pharmacy to verify Rx for procedure prep is ready for pick-up. Patient was advised to call the scheduling department at [phone] if pharmacy states no Rx is available. Patient was advised to call the endoscopy scheduling department if any questions or concerns arise.       Endoscopy Scheduling Department            Miralax Instructions for Colonoscopy      Date of procedure: 07/16/2024 Arrive at: 9:45 Am     Location of Department: [address] [place], [place] [address], [place] [address]  Take the Elevators to 2nd Floor Endoscopy Procedural Area      As soon as possible:   your over the counter prep (MIRALAX) and DULCOLAX LAXATIVE TABLETS     On the day before your procedure   What You CAN do:   You may have clear liquids ONLY-see below for list.    Liquids That Are OK to Drink:   Water  Sports drinks (Gatorade, Power-Aid)  Coffee or tea (no cream or nondairy creamer)  Clear juices without pulp (apple, white grape)  Gelatin desserts  (no fruit or toppings)  Clear soda (sprite, coke, ginger ale)  Chicken broth (until 12 midnight the night before procedure)      What You CANNOT do:   Do not EAT solid food, drink milk or anything colored red.  Do not drink alcohol.  Do not take oral medications within 1 hour of starting   each dose of MIRALAX.  No gum chewing or candy morning of procedure.    Note:   (Please disregard the MIRALAX bottle instructions).    It is not uncommon to experience some abdominal cramping, nausea and/or vomiting when taking the prep. If you have nausea and/or vomiting while taking the prep, stop drinking for 20 to 30 minutes then resume.    IMPORTANT: If you experience preparation-related symptoms (for example, nausea, bloating, or cramping), pause, or slow the rate of drinking the additional water until your symptoms decrease.    How to take prep:    MIRALAX Bowel Prep-One (1) bottle-8.3 oz. (238 grams).     You must drink water with each dose, and additional water after each dose.      DOSE 1--Day Before Procedure  07/15/2024 drink at least 6 to 8 glasses of clear liquids from time you wake up until you begin your prep and then continue until bedtime to avoid dehydration.       Step 1- 12:00 pm (NOON) - Mix the 8.3 oz. bottle of Miralax (238 grams) and 64 oz. of Gatorade/Power-Aid, place in the refrigerator (do not add ice). Take four (4) Dulcolax (Bisacodyl) tablets with at least 8 oz. or more of clear liquids.    Step 2- 6:00 pm- Drink one 8 oz. glass of the Miralax/Gatorade prep every 15 minutes until half the mixture (32 oz./quart) is gone. Set a timer as a reminder. Refrigerate the remaining half of prep for dose 2. See below when to begin this step.     Drink additional water/clear liquids    DOSE 2--Day of the Procedure  07/16/2024     Step 1- 2-3 AM.-Drink the 2nd half of the prep.   Step 2- You may continue drinking water/clear liquids until   4 hours before your colonoscopy or as directed by the scheduling nurse  6:45 am.    For information about your procedure, two (2) things to view prior to colonoscopy:  Please watch this informational video. It is important to watch this animated consent video prior to your arrival. If you haven't watched the video prior to arriving, you are required to watch it during admission which can causes delays.    Options for viewing:   Using a keyboard:  press and hold the control tab (Ctrl) and left mouse click to follow links.           Colonoscopy Instructional Video                                                                                   OR    Type link address into your web browser's address bar:  [email or web address]      Educational Booklet with pictures:      Colonoscopy Prep - Liquid    Comments:          IMPORTANT INFORMATION TO KNOW BEFORE YOUR PROCEDURE    [facility] 2nd Floor--Enter at the rear of the building through the emergency department screening station or the Outpatient Registration door, then continue to endoscopy department on the 2nd floor.      If your procedure requires the administration of anesthesia, it is necessary for a responsible adult to drive you home. (Medical Transportation, Uber, Lyft, Taxi, etc. may ONLY be used if a responsible adult is present to accompany you home.  The responsible adult CAN'T be the  of the service).      person must be available to return to pick you up within 15 minutes of being notified of discharge.       Please bring a picture ID, insurance card, & copayment      Take Medications as directed below:      1) Stop taking Plavix (clopidogrel) 5 days (prior to the procedure) on:  06/30/2024         If you begin taking any blood thinning medications or injectable weight loss/diabetes medications (other than insulin) , please contact the endoscopy scheduling department listed below as soon as possible.    If you are diabetic see the attached instruction sheet regarding  your medication.     If you take HEART, BLOOD PRESSURE, SEIZURE, PAIN, LUNG (including inhalers/nebulizers), ANTI-REJECTION (transplant patients), or PSYCHIATRIC medications, please take at your regular times with a sip of water or as directed by the scheduling nurse.     Important contact information:    Endoscopy [phone] Hours of operation Monday-Friday 8:00-4:30pm.    Questions about insurance or financial obligations call [phone] or [phone].    If you have questions regarding the prep or need to reschedule, please call [phone]. After hours questions requiring immediate assistance, contact [facility] On-Call nurse line at [phone] or [phone].   NOTE:     On occasion, unforeseen circumstances may cause a delay in your procedure start time. We respect your time and appreciate your patience during these circumstances.      Comments:     Are you ready for your Colonoscopy?      __ If you take blood thinners,weight loss or diabetic injectable medications, have you stopped taking them according to your doctor's instructions before your procedures?  __ Have you stopped eating solid foods and followed a clear liquid diet a full day before your procedure or followed the diet       guidelines indicated in your instructions?       REMINDER: NO BROTH AFTER MIDNIGHT THE DAY BEFORE PROCEDURE.   __ Have you completed all your prep solution? PLEASE DO NOT FOLLOW the insert/or box instructions from pharmacy)  __ Have you taken your blood pressure, heart, seizure, or other essential medications the morning of your procedure?  __ Have you planned for a ride to and from procedure?  (Medical Transportation, Uber, Lyft, Taxi, etc. may ONLY be used if a responsible adult is present to accompany you home). The responsible adult CANNOT be the  of the service.  person must be available to return and pick you up within 15 minutes of being notified of discharge.           Questions  or Concerns?  Please call us!  [phone] (M-F) [phone] (Nights and weekends)    Listed below are some helpful tips:    Please bring protective cases for eyewear and hearing aids-wear comfortable clothing/shoes.  Follow prep instructions closely so you don't have to do it twice.  Bowel prep is prescription used to clean out the colon before a colonoscopy. The prep increases movement of your colon by causing you to have diarrhea (loose stools). Cleaning out stool from the colon helps your doctor to see in your colon clearly during this procedure.   It is important to stay hydrated before, during and after bowel prep to prevent loss of fluid (dehydration). You can have water and your choice of clear liquids. Reminder: these liquids you will drink in addition to the bowel prep.     Preparing the mixture:    First, mix the prep with water.  Make the taste better by adding a sugar free drink mix (Crystal Light) can improve the taste of your prep.   Use a large bore (opening) straw. Place towards the back of mouth (throat) as tolerated.  Prepare a prep mixture that is lightly chilled, but not ice-cold. Drinking a large amount of ice-cold liquid can make you feel very ill.  Avoid drinking any RED beverages or eating popsicles with this color for the 24 hours leading up to your procedure. This color can look like blood in the colon.    Consuming the prep:    Take your time. If you feel ill, take a 15-minute break from drinking the prep mixture  Combat hunger and dehydration with clear liquids. Options like JELL-O, or popsicles will help.  Settle in with good reading material. The goal is to clean out 6 feet of colon, so you can plan on spending a good deal of time in the bathroom. Have some good reading material on-hand or an iPad ready to keep yourself entertained!  Keep yourself comfortable. We recommend applying personal hygiene wipes, Tucks pads and a soothing ointment, like A&D ointment, Desitin, or Vaseline to  your bottom before starting and as needed to protect your skin.

## 2024-04-23 NOTE — TELEPHONE ENCOUNTER
----- Message from Baylee Richardson RN sent at 4/19/2024  1:47 PM CDT -----  Regarding: FW: Referral    ----- Message -----  From: Isadora Ann  Sent: 4/19/2024   1:43 PM CDT  To: [facility] Endo Schedulers  Subject: Referral                                         Good afternoon,      I received a [facility] referral on behalf of the patient above from Dino Horner with a request for screening for colon cancer.  I have scanned the referral and notes into media mgr. Please review and contact the patient to schedule and notify Mine at [facility] of date and location of appointment.  She can be reached at [phone].     Thank you,     Isadora Ann  [facility]

## 2024-04-29 ENCOUNTER — TELEPHONE (OUTPATIENT)
Dept: ENDOSCOPY | Facility: HOSPITAL | Age: 69
End: 2024-04-29
Payer: COMMERCIAL

## 2024-04-29 NOTE — TELEPHONE ENCOUNTER
----- Message from Jassi Herrera MA sent at 2024  4:15 PM CDT -----  Regardin/5- BT  The patient is currently under an external cardiologist 2024 care and requires a blood thinner Plavix (clopidogrel) for their upcoming scheduled Colonoscopy on 2024.         External provider information:    Physician name: Dino Horner MD  Facility/Location: [facility]   Phone number: Fax: [phone]    Notes:

## 2024-04-29 NOTE — TELEPHONE ENCOUNTER
Contacted patient regarding recommendation of Dr Horner.  Explained that he will be canceled for colonoscopy and [facility] will follow up with him regarding rescheduling procedure.  Stated understanding.

## 2024-04-29 NOTE — TELEPHONE ENCOUNTER
Contacted JEFFERY and spoke to Mine.  Explained about patient needing neurology and cardiology clearance prior to procedure.  She informed me that she spoke to Dr Horner and he wants to cancel the procedure at this time.  They will follow up with patient to instruct.

## 2024-04-30 ENCOUNTER — TELEPHONE (OUTPATIENT)
Dept: UROLOGY | Facility: CLINIC | Age: 69
End: 2024-04-30
Payer: COMMERCIAL

## 2024-05-10 ENCOUNTER — OFFICE VISIT (OUTPATIENT)
Dept: UROLOGY | Facility: CLINIC | Age: 69
End: 2024-05-10
Payer: COMMERCIAL

## 2024-05-10 VITALS — WEIGHT: 159.31 LBS | BODY MASS INDEX: 21.6 KG/M2

## 2024-05-10 DIAGNOSIS — N13.8 BPH WITH OBSTRUCTION/LOWER URINARY TRACT SYMPTOMS: Primary | ICD-10-CM

## 2024-05-10 DIAGNOSIS — Z80.42 FAMILY HISTORY OF PROSTATE CANCER: ICD-10-CM

## 2024-05-10 DIAGNOSIS — R35.1 NOCTURIA: ICD-10-CM

## 2024-05-10 DIAGNOSIS — N52.9 ERECTILE DYSFUNCTION OF ORGANIC ORIGIN: ICD-10-CM

## 2024-05-10 DIAGNOSIS — N40.1 BPH WITH OBSTRUCTION/LOWER URINARY TRACT SYMPTOMS: Primary | ICD-10-CM

## 2024-05-10 PROCEDURE — 99999 PR PBB SHADOW E&M-EST. PATIENT-LVL III: CPT | Mod: PBBFAC,,, | Performed by: UROLOGY

## 2024-05-10 PROCEDURE — 99214 OFFICE O/P EST MOD 30 MIN: CPT | Mod: S$GLB,,, | Performed by: UROLOGY

## 2024-05-10 NOTE — PROGRESS NOTES
Subjective:       Patient ID: Prasanna Scott is a 69 y.o. male The patient's last visit with me was on 3/3/2023.     Chief Complaint:   No chief complaint on file.    67 y.o. who presents to the Urology clinic for evaluation of LUTS. Patient currently voiding well without complaints. He has brother who was treated for prostate cancer with brachytherapy. He is due for PSA, not yet obtained. Denies unexplained weight loss, hematuria, dysuria, flank pain, SOB, CP.   Denies bothersome urgency, frequency  Taking tadalafil for ED, Rx per PCP    Benign Prostatic Hyperplasia  He patient reports intermittency and nocturia two times a night. He denies straining. The patient states symptoms are of mild severity. Onset of symptoms was several years ago and was gradual in onset.  He has no personal history and a family history of prostate cancer. He reports a history of no complicating symptoms. He denies flank pain, gross hematuria, kidney stones, and recurrent UTI.  He is currently taking no prostate medication.     05/10/2024  He feels well.     ACTIVE MEDICAL ISSUES:  Patient Active Problem List   Diagnosis    Finger pain    Primary osteoarthritis of first carpometacarpal joint of right hand    Unintentional opioid overdose    Arm numbness    Hypertension       ALLERGIES AND MEDICATIONS: updated and reviewed.  Review of patient's allergies indicates:  No Known Allergies  Current Outpatient Medications   Medication Sig    aspirin (ECOTRIN) 81 MG EC tablet Take 1 tablet (81 mg total) by mouth once daily.    atorvastatin (LIPITOR) 20 MG tablet Take 1 tablet (20 mg total) by mouth every evening.    clopidogreL (PLAVIX) 75 mg tablet Take 1 tablet (75 mg total) by mouth once daily.    ergocalciferol (ERGOCALCIFEROL) 50,000 unit Cap Take 1 capsule by mouth every 7 days.    gabapentin (NEURONTIN) 600 MG tablet Take 1 tablet by mouth 3 (three) times daily.    HYDROcodone-acetaminophen (NORCO)  mg per tablet TAKE 1 TABLET BY  MOUTH FIVE TIMES DAILY AS NEEDED FOR PAIN    metoprolol tartrate (LOPRESSOR) 25 MG tablet metoprolol tartrate 25 mg tablet   TAKE 1 TABLET BY MOUTH TWICE DAILY    olmesartan (BENICAR) 40 MG tablet Take 40 mg by mouth once daily.     No current facility-administered medications for this visit.       Review of Systems    Objective:      Vitals:    05/10/24 1359   Weight: 72.3 kg (159 lb 4.5 oz)     Physical Exam  Vitals and nursing note reviewed.   Constitutional:       Appearance: He is well-developed.   HENT:      Head: Normocephalic.   Eyes:      Conjunctiva/sclera: Conjunctivae normal.   Neck:      Thyroid: No thyromegaly.      Trachea: No tracheal deviation.   Cardiovascular:      Rate and Rhythm: Normal rate.      Heart sounds: Normal heart sounds.   Pulmonary:      Effort: Pulmonary effort is normal. No respiratory distress.      Breath sounds: Normal breath sounds. No wheezing.   Abdominal:      General: Bowel sounds are normal. There is no distension.      Palpations: Abdomen is soft. There is no mass.      Tenderness: There is no abdominal tenderness. There is no guarding or rebound.      Hernia: No hernia is present. There is no hernia in the right inguinal area or left inguinal area.   Genitourinary:     Penis: Normal.       Testes: Normal.         Right: Mass or tenderness not present.         Left: Mass or tenderness not present.      Prostate: Enlarged. Not tender.      Rectum: Normal. No mass, tenderness or external hemorrhoid.      Comments: 40 gm smooth  Musculoskeletal:         General: No tenderness. Normal range of motion.      Cervical back: Normal range of motion and neck supple.   Lymphadenopathy:      Cervical: No cervical adenopathy.      Lower Body: No right inguinal adenopathy. No left inguinal adenopathy.   Skin:     General: Skin is warm and dry.      Findings: No erythema or rash.   Neurological:      Mental Status: He is alert and oriented to person, place, and time.   Psychiatric:          Behavior: Behavior normal.         Thought Content: Thought content normal.         Judgment: Judgment normal.         Urine dipstick shows negative for all components.  Micro exam: negative for WBC's or RBC's.          Component Ref Range & Units 1 yr ago 2 yr ago   PSA Diagnostic 0.00 - 4.00 ng/mL 2.7 2.1 CM   Comment: The testing method is a chemiluminescent microparticle immunoassay  manufactured by [employer] and performed on the [employer]  or  [employer] system. Values obtained with different assay manufacturers  for  methods may be different and cannot be used interchangeably.  PSA Expected levels:  Hormonal Therapy: <0.05 ng/ml  Prostatectomy: <0.01 ng/ml  Radiation Therapy: <1.00 ng/ml   Resulting Agency  [facility]              Specimen Collected: 03/02/23 11:03 CST Last Resulted: 03/02/23 21:21 CST             Assessment:       1. BPH with obstruction/lower urinary tract symptoms    2. Nocturia    3. Erectile dysfunction of organic origin    4. Family history of prostate cancer          Plan:       1. BPH with obstruction/lower urinary tract symptoms    - Prostate Specific Antigen, Diagnostic; Future  - Prostate Specific Antigen, Diagnostic; Future    2. Nocturia      3. Erectile dysfunction of organic origin  Viagra    4. Family history of prostate cancer  As above            Follow up in about 1 year (around 5/10/2025) for Follow up Established, Review PSA.

## 2024-05-14 NOTE — PROGRESS NOTES
CHW - Case Closure    This Community Health Worker spoke to patient via telephone today.   Pt reported: Patient states he has no needs or request assistance at this time. Pt has graduated and agreed to case closure.   Pt denied any additional needs at this time and agrees with episode closure at this time.  Provided patient with Community Health Worker's contact information and encouraged him to contact this Community Health Worker if additional needs arise.

## 2024-07-22 ENCOUNTER — TELEPHONE (OUTPATIENT)
Dept: ENDOSCOPY | Facility: HOSPITAL | Age: 69
End: 2024-07-22
Payer: COMMERCIAL

## 2024-07-22 NOTE — TELEPHONE ENCOUNTER
Contacted Mine at [facility] regarding message.  Explained that from our last conversation on 4/29/24-patient needed to be seen by Neurology and Cardiology prior to procedure.  She confirmed and explained that she had scheduled the appointments but the patient didn't go.  She will follow up and make sure that he is scheduled again.  Will call back after.

## 2024-07-22 NOTE — TELEPHONE ENCOUNTER
----- Message from Awilda Mendez sent at 7/19/2024  8:10 AM CDT -----  Regarding: FW: Reschedule    ----- Message -----  From: Isadora Ann  Sent: 7/18/2024   2:37 PM CDT  To: [facility] Endoscopy Schedulers  Subject: Reschedule                                       Good afternoon,      I received a call from Mien at [facility] on behalf of the patient attached requesting he be rescheduled for the Colonoscopy appt he missed.  Please contact Mine at [phone]) to schedule the appointment.        Thank you,      Isadora Ann  [facility]

## 2024-08-20 ENCOUNTER — TELEPHONE (OUTPATIENT)
Dept: DERMATOLOGY | Facility: CLINIC | Age: 69
End: 2024-08-20
Payer: COMMERCIAL

## 2025-01-10 DIAGNOSIS — Z00.01 ENCOUNTER FOR GENERAL ADULT MEDICAL EXAMINATION WITH ABNORMAL FINDINGS: Primary | ICD-10-CM

## 2025-01-13 DIAGNOSIS — Z72.0 TOBACCO ABUSE: Primary | ICD-10-CM

## 2025-01-14 ENCOUNTER — OFFICE VISIT (OUTPATIENT)
Dept: CARDIOLOGY | Facility: CLINIC | Age: 70
End: 2025-01-14
Payer: COMMERCIAL

## 2025-01-14 VITALS
SYSTOLIC BLOOD PRESSURE: 145 MMHG | DIASTOLIC BLOOD PRESSURE: 75 MMHG | OXYGEN SATURATION: 99 % | RESPIRATION RATE: 15 BRPM | WEIGHT: 148.56 LBS | HEIGHT: 72 IN | HEART RATE: 74 BPM | BODY MASS INDEX: 20.12 KG/M2

## 2025-01-14 DIAGNOSIS — I77.810 ACQUIRED DILATION OF ASCENDING AORTA AND AORTIC ROOT: ICD-10-CM

## 2025-01-14 DIAGNOSIS — I10 PRIMARY HYPERTENSION: ICD-10-CM

## 2025-01-14 DIAGNOSIS — Z01.810 PREOPERATIVE CARDIOVASCULAR EXAMINATION: Primary | ICD-10-CM

## 2025-01-14 DIAGNOSIS — Z00.01 ENCOUNTER FOR GENERAL ADULT MEDICAL EXAMINATION WITH ABNORMAL FINDINGS: ICD-10-CM

## 2025-01-14 DIAGNOSIS — E78.49 OTHER HYPERLIPIDEMIA: ICD-10-CM

## 2025-01-14 PROCEDURE — 99203 OFFICE O/P NEW LOW 30 MIN: CPT | Mod: S$GLB,,, | Performed by: INTERNAL MEDICINE

## 2025-01-14 PROCEDURE — 93000 ELECTROCARDIOGRAM COMPLETE: CPT | Mod: S$GLB,,, | Performed by: INTERNAL MEDICINE

## 2025-01-14 PROCEDURE — 99999 PR PBB SHADOW E&M-EST. PATIENT-LVL III: CPT | Mod: PBBFAC,,, | Performed by: INTERNAL MEDICINE

## 2025-01-14 NOTE — PROGRESS NOTES
CARDIOLOGY CLINIC VISIT        HISTORY OF PRESENT ILLNESS:     2025: Prasanna Scott presents for preoperative evaluation prior to colonoscopy.  Denies chest pain or shortness of breath.  No PND or orthopnea.  Denies any history of coronary artery disease.  History of hypertension.  Echocardiogram from last year showed an ejection fraction of 55-60%.  Mildly dilated aortic root and ascending aorta.    CARDIOVASCULAR HISTORY:     Dilated aortic root and ascending aorta    PAST MEDICAL HISTORY:     Past Medical History:   Diagnosis Date    Arthritis        PAST SURGICAL HISTORY:     Past Surgical History:   Procedure Laterality Date    arm surgury      CARPAL TUNNEL RELEASE      neck surgury      TIBIA FRACTURE SURGERY      ULNAR NERVE TRANSPOSITION         ALLERGIES AND MEDICATION:   Review of patient's allergies indicates:  No Known Allergies     Medication List            Accurate as of 2025  1:43 PM. If you have any questions, ask your nurse or doctor.                CONTINUE taking these medications      aspirin 81 MG EC tablet  Commonly known as: ECOTRIN  Take 1 tablet (81 mg total) by mouth once daily.     atorvastatin 20 MG tablet  Commonly known as: LIPITOR  Take 1 tablet (20 mg total) by mouth every evening.     ergocalciferol 50,000 unit Cap  Commonly known as: ERGOCALCIFEROL     gabapentin 600 MG tablet  Commonly known as: NEURONTIN     HYDROcodone-acetaminophen  mg per tablet  Commonly known as: NORCO     metoprolol tartrate 25 MG tablet  Commonly known as: LOPRESSOR     olmesartan 40 MG tablet  Commonly known as: BENICAR              SOCIAL HISTORY:     Social History     Socioeconomic History    Marital status: Single   Tobacco Use    Smoking status: Former     Current packs/day: 0.00     Types: Cigarettes     Quit date: 2017     Years since quittin.6    Smokeless tobacco: Former   Substance and Sexual Activity    Alcohol use: Yes     Alcohol/week: 5.0 standard drinks of  alcohol     Types: 5 Cans of beer per week    Drug use: No    Sexual activity: Yes     Partners: Female     Social Drivers of Health     Financial Resource Strain: Low Risk  (2/27/2024)    Overall Financial Resource Strain (CARDIA)     Difficulty of Paying Living Expenses: Not hard at all   Food Insecurity: No Food Insecurity (2/27/2024)    Hunger Vital Sign     Worried About Running Out of Food in the Last Year: Never true     Ran Out of Food in the Last Year: Never true   Transportation Needs: No Transportation Needs (2/27/2024)    PRAPARE - Transportation     Lack of Transportation (Medical): No     Lack of Transportation (Non-Medical): No   Physical Activity: Insufficiently Active (2/27/2024)    Exercise Vital Sign     Days of Exercise per Week: 5 days     Minutes of Exercise per Session: 20 min   Stress: No Stress Concern Present (2/27/2024)    [employer] [place] [employer] - Occupational Stress Questionnaire     Feeling of Stress : Not at all   Housing Stability: Low Risk  (2/27/2024)    Housing Stability Vital Sign     Unable to Pay for Housing in the Last Year: No     Number of Places Lived in the Last Year: 1     Unstable Housing in the Last Year: No       FAMILY HISTORY:   No family history on file.    REVIEW OF SYSTEMS:   Review of Systems   Constitutional:  Negative for chills, diaphoresis, fever, malaise/fatigue and weight loss.   HENT:  Negative for congestion, hearing loss, sinus pain, sore throat and tinnitus.    Eyes:  Negative for blurred vision, double vision, photophobia and pain.   Respiratory:  Negative for cough, hemoptysis, sputum production, shortness of breath, wheezing and stridor.    Cardiovascular:  Negative for chest pain, palpitations, orthopnea, claudication, leg swelling and PND.   Gastrointestinal:  Negative for abdominal pain, blood in stool, heartburn, melena, nausea and vomiting.   Musculoskeletal:  Positive for joint pain. Negative for back pain, falls, myalgias and  neck pain.   Neurological:  Negative for dizziness, tingling, tremors, sensory change, speech change, focal weakness, seizures, loss of consciousness, weakness and headaches.   Endo/Heme/Allergies:  Does not bruise/bleed easily.   Psychiatric/Behavioral:  Negative for depression, memory loss and substance abuse. The patient is not nervous/anxious.        PHYSICAL EXAM:     Vitals:    01/14/25 1127   BP: (!) 145/75   Pulse: 74   Resp: 15    Body mass index is 20.15 kg/m².  Weight: 67.4 kg (148 lb 9.4 oz)   Height: 6' (182.9 cm)     Physical Exam  Vitals reviewed.   Constitutional:       General: He is not in acute distress.     Appearance: Normal appearance. He is well-developed. He is not diaphoretic.   HENT:      Head: Normocephalic.   Eyes:      Extraocular Movements: Extraocular movements intact.   Neck:      Vascular: No carotid bruit or JVD.   Cardiovascular:      Rate and Rhythm: Normal rate and regular rhythm.      Pulses: Normal pulses.      Heart sounds: Murmur heard.      Systolic murmur is present with a grade of 2/6.   Pulmonary:      Effort: Pulmonary effort is normal.      Breath sounds: Normal breath sounds. No wheezing, rhonchi or rales.   Chest:      Chest wall: No tenderness.   Abdominal:      General: Bowel sounds are normal. There is no distension.      Palpations: Abdomen is soft.      Tenderness: There is no abdominal tenderness.   Musculoskeletal:      Right lower leg: No edema.      Left lower leg: No edema.   Skin:     General: Skin is warm and dry.      Coloration: Skin is not jaundiced or pale.      Findings: No erythema.   Neurological:      General: No focal deficit present.      Mental Status: He is alert and oriented to person, place, and time.      Motor: No weakness.   Psychiatric:         Speech: Speech normal.         Behavior: Behavior normal.         Thought Content: Thought content normal.         DATA:   EKG: (personally reviewed tracing)  01/14/2025- sinus rhythm with  PACs  Results for orders placed or performed during the hospital encounter of 02/26/24   ECG 12 lead    Collection Time: 02/26/24 10:07 PM   Result Value Ref Range    QRS Duration 106 ms    OHS QTC Calculation 426 ms    Narrative    Test Reason : R29.818,    Vent. Rate : 065 BPM     Atrial Rate : 065 BPM     P-R Int : 162 ms          QRS Dur : 106 ms      QT Int : 410 ms       P-R-T Axes : 068 053 066 degrees     QTc Int : 426 ms    Normal sinus rhythm with sinus arrhythmia  Incomplete right bundle branch block  Abnormal ECG    When compared with ECG of 27-FEB-2016 16:22,  No significant change was found  Confirmed by Arnel Martin MD ([id number]) on 2/27/2024 5:16:11 PM    Referred By: MONIQUE   SELF           Confirmed By:Arnel Martin MD        Laboratory:  CBC:  Recent Labs   Lab 02/26/24 2209 02/27/24  0502 02/28/24  0435   WBC 11.12 8.19 6.78   Hemoglobin 11.1 L 10.5 L 12.5 L   Hematocrit 32.5 L 31.0 L 37.9 L   Platelets 236 207 286       CHEMISTRIES:  Recent Labs   Lab 02/26/24 2209 02/27/24  0502 02/28/24  0435   Glucose 107 89 97   Sodium 130 L 130 L 131 L   Potassium 4.6 4.7 4.8   BUN 14 12 15   Creatinine 1.2 0.8 0.8   Calcium 8.2 L 8.3 L 8.7   Magnesium  --  1.8  --        CARDIAC BIOMARKERS:  Recent Labs   Lab 02/26/24 2209 02/27/24  0502   Troponin I <0.006 <0.006       COAGS:  Recent Labs   Lab 02/26/24 2209   INR 1.0       LIPIDS/LFTS:  Recent Labs   Lab 02/26/24 2209 02/27/24  0502 02/28/24  0435   Cholesterol 128  --   --    Triglycerides 166 H  --   --    HDL 41  --   --    LDL Cholesterol 53.8 L  --   --    Non-HDL Cholesterol 87  --   --    AST 25 24 23   ALT 23 21 22       Hemoglobin A1C   Date Value Ref Range Status   02/27/2024 5.7 (H) 4.0 - 5.6 % Final     Comment:     ADA Screening Guidelines:  5.7-6.4%  Consistent with prediabetes  >or=6.5%  Consistent with diabetes    High levels of fetal hemoglobin interfere with the HbA1C  assay. Heterozygous hemoglobin variants (HbS, HgC,  etc)do  not significantly interfere with this assay.   However, presence of multiple variants may affect accuracy.          The ASCVD Risk score (Hilary DK, et al., 2019) failed to calculate for the following reasons:    The valid total cholesterol range is 130 to 320 mg/dL      Cardiovascular Testing:    Echo    Result Date: 2/27/2024    Left Ventricle: There is normal systolic function with a visually   estimated ejection fraction of 55 - 60%. There is normal diastolic   function.    Right Ventricle: Normal right ventricular cavity size. Systolic   function is normal.    Aorta: Aortic root is mildly dilated measuring 3.87 cm. Ascending aorta   is mildly dilated measuring 3.66 cm.    No intracardiac source of embolus noted on this exam.  If high clinical   suspicion, consider MICHAEL +/- bubble study.          No cardiac monitor results found for the past 12 months         ASSESSMENT:     Preoperative cardiovascular evaluation  Hypertension  Hyperlipidemia  Dilation aortic root/ascending aorta  Tobacco use  Anemia    PLAN:     Preoperative cardiovascular evaluation: Patient can proceed.  Functional capacity greater than 4 Mets.  RCRI 3.9% 30 day risk of death, MI or cardiac arrest.  Recommend continuing Lopressor mary operatively.  Can hold aspirin 5-7 days prior to procedure.  Hypertension: Continue current management.  Dilation aortic root/ascending aorta: Monitor.  Return to clinic 6 months.           Nick De Los Santos MD, MPH, FACC, [facility]

## 2025-01-15 ENCOUNTER — OFFICE VISIT (OUTPATIENT)
Dept: NEUROLOGY | Facility: CLINIC | Age: 70
End: 2025-01-15
Payer: COMMERCIAL

## 2025-01-15 VITALS
BODY MASS INDEX: 20.22 KG/M2 | HEART RATE: 69 BPM | HEIGHT: 72 IN | DIASTOLIC BLOOD PRESSURE: 69 MMHG | SYSTOLIC BLOOD PRESSURE: 139 MMHG | WEIGHT: 149.25 LBS

## 2025-01-15 DIAGNOSIS — Z00.01 ENCOUNTER FOR GENERAL ADULT MEDICAL EXAMINATION WITH ABNORMAL FINDINGS: ICD-10-CM

## 2025-01-15 LAB
OHS QRS DURATION: 98 MS
OHS QTC CALCULATION: 437 MS

## 2025-01-15 PROCEDURE — 99214 OFFICE O/P EST MOD 30 MIN: CPT | Mod: S$GLB,,, | Performed by: INTERNAL MEDICINE

## 2025-01-15 PROCEDURE — 99999 PR PBB SHADOW E&M-EST. PATIENT-LVL III: CPT | Mod: PBBFAC,,, | Performed by: INTERNAL MEDICINE

## 2025-01-15 NOTE — PROGRESS NOTES
GENERAL NEUROLOGY VISIT   01/15/2025  History:    Patient is a 69 y.o. male with past medical history of arthritis, carpal tunnel release, ulnar nerve surgery, cervical spine fusionx4, chronic left leg neuropathy post accident presenting to the clinic for clearance for colonoscopy.  History obtained from patient and chart review.    Patient had brief right arm weakness and had presented to the ED in 2024.  Patient has cervical pathology and thinks that he hyperextended his neck leading to the brief right arm weakness.  MRI brain and CTA head and neck was obtained.  Patient was treated as TIA and discharged home with brief DAPT and Lipitor.  Patient is currently on aspirin monotherapy as well as Lipitor and compliant on medications.  Denies any further stroke-like symptoms since discharge.  Does have baseline numbness in bilateral upper extremities as well as in left lower extremity secondary to neuropathies.  Patient is being planned for colonoscopy and needs clearance from Neurology and that is why presents to the clinic today.  Chronic smoker.    Past Medical History:   Diagnosis Date    Arthritis        Past Surgical History:   Procedure Laterality Date    arm surgury      CARPAL TUNNEL RELEASE      neck surgury      TIBIA FRACTURE SURGERY      ULNAR NERVE TRANSPOSITION         Social History     Socioeconomic History    Marital status: Single   Tobacco Use    Smoking status: Former     Current packs/day: 0.00     Types: Cigarettes     Quit date: 2017     Years since quittin.6    Smokeless tobacco: Former   Substance and Sexual Activity    Alcohol use: Yes     Alcohol/week: 5.0 standard drinks of alcohol     Types: 5 Cans of beer per week    Drug use: No    Sexual activity: Yes     Partners: Female     Social Drivers of Health     Financial Resource Strain: Low Risk  (2024)    Overall Financial Resource Strain (CARDIA)     Difficulty of Paying Living Expenses: Not hard at all   Food Insecurity:  No Food Insecurity (2/27/2024)    Hunger Vital Sign     Worried About Running Out of Food in the Last Year: Never true     Ran Out of Food in the Last Year: Never true   Transportation Needs: No Transportation Needs (2/27/2024)    PRAPARE - Transportation     Lack of Transportation (Medical): No     Lack of Transportation (Non-Medical): No   Physical Activity: Insufficiently Active (2/27/2024)    Exercise Vital Sign     Days of Exercise per Week: 5 days     Minutes of Exercise per Session: 20 min   Stress: No Stress Concern Present (2/27/2024)    [employer] [place] [employer] - Occupational Stress Questionnaire     Feeling of Stress : Not at all   Housing Stability: Low Risk  (2/27/2024)    Housing Stability Vital Sign     Unable to Pay for Housing in the Last Year: No     Number of Places Lived in the Last Year: 1     Unstable Housing in the Last Year: No       Review of patient's allergies indicates:  No Known Allergies    Current Outpatient Medications on File Prior to Visit   Medication Sig Dispense Refill    aspirin (ECOTRIN) 81 MG EC tablet Take 1 tablet (81 mg total) by mouth once daily. 90 tablet 2    atorvastatin (LIPITOR) 20 MG tablet Take 1 tablet (20 mg total) by mouth every evening. 90 tablet 3    ergocalciferol (ERGOCALCIFEROL) 50,000 unit Cap Take 1 capsule by mouth every 7 days.      gabapentin (NEURONTIN) 600 MG tablet Take 1 tablet by mouth 3 (three) times daily.      HYDROcodone-acetaminophen (NORCO)  mg per tablet TAKE 1 TABLET BY MOUTH FIVE TIMES DAILY AS NEEDED FOR PAIN      metoprolol tartrate (LOPRESSOR) 25 MG tablet Take 50 mg by mouth 2 (two) times daily.      olmesartan (BENICAR) 40 MG tablet Take 40 mg by mouth once daily.       No current facility-administered medications on file prior to visit.        Family history:  Noncontributory    Review Of Systems     Constitutional Negative for fevers, chills, weigh loss   HEENT Negative for hearing loss, dysphagia, sore throat,  diplopia   Respiratory Negative for shortness of breath, cough    Cardiovascular Negative for chest pain, palpitations    Gastrointestinal Negative for constipation, diarrhea, early satiety    Skin Negative for rashes    Musculoskeletal Negative for joint pains, myalgias.   Neurological See Above    Psychological Negative for sleep disturbances.    Heme/Lymph Negative for easy bruising, easy bleeding    Endocrine Negative for polyuria, polydypsia     Physical Exam:     Physical Examination  /69 (BP Location: Right arm, Patient Position: Sitting)   Pulse 69   Ht 6' (1.829 m)   Wt 67.7 kg (149 lb 4 oz)   BMI 20.24 kg/m²   Body mass index is 20.24 kg/m².      Neurological Exam  Mental Status:   Alert and oriented to name, date, location  Recent/remote memory, registration, attention span/concentration, fund of knowledge intact by history.    CN:   II, III, IV, VI: PERRL, EOMI, no nystagmus, fundus not visualized due to inadequate dilation.V: intact to fine touch, temperature, pain.VII: symmetrical facial movement, nice smile, no drooping.VIII: grossly intact to hearing XII: tongue midline    Motor:    ShAb ElbEx ElbFle WrE WrFle Interos  HipFl KnExt KnFlex FtDors FtPlant   Left 5 5 5 5 5 5 3 5 5 5 4 4   Right 5 5 5 5 5 5 3 5 5 5 5 5   Tone: Normal tone in UE and LE, no atrophy, no fasciculation, no tremor no pronator drift.                Reflexes:   No Clonus, down going toes b/l.    Sensation: on both UEs and LEs    Reduced in all modalities in the left lower extremity  Reduced to light touch in 1st 3 fingers in both hands    Coordination:    Tremor: Absent.Dysmetria: Absent    Gait:    Not tested    Interval/Previous Work-up:   CTA head and neck:  2024: no LVO/HGS    MRI brain without contrast 2024: [facility]    Impression:   #stroke-like symptoms  TIA versus cervical radiculopathy leading to brief right arm weakness and numbness.  Patient has not had any stroke-like symptoms since then.  On aspirin and  Lipitor.  Current tobacco use.    #polyneuropathy  Baseline weak  as well as reduced sensation bilateral hands due to ulnar as well as median nerve compression pathology.  Also has known injury in the left leg complicated by nerve damage with baseline weakness in the ankle as well as sensory deficits.    Plan:   - continue on aspirin 81 mg daily.  Unclear if patient needs to hold aspirin prior to colonoscopy.  If at all aspirin needs to be held prior to procedure, kindly proceed with shortness time possible.  - continue Lipitor 20 mg daily.  - patient states that he has had hemoglobin A1c as well as LDL checked last week by his [facility] providers  -Goal Parameters for TIA/stroke: LDL<70 mg/dl, HbA1C: <7.0 %,  SBP<130/80 (discussed risk factor optimization in order to reduce the risk of future events with help of PCP)  -Diet and Exercise: Discussed aggressive lifestyle modification. Eat primarily plant-based foods, such as fruits and vegetables, whole grains, legumes (beans) and nuts. Discussed Mediterranean diet. Daily exercise (150 minutes per week).  -Provided education regarding future stroke identification: I went over the usual stroke warning signs and symptoms, and the need to activate EMS (call 911) as soon as the symptoms present including-sudden onset numbness or weakness of the face, arm, or leg; especially on one side of the body; sudden confusion, trouble speaking, or understanding; sudden trouble seeing in one or both eyes; sudden trouble walking; dizziness; loss of coordination.      RTC as needed    Time spent on this encounter: 35 minutes. This includes face to face time and non-face to face time preparing to see the patient (eg, review of tests), obtaining and/or reviewing separately obtained history, documenting clinical information in the electronic or other health record, independently interpreting results and communicating results to the patient/family/caregiver, or care coordinator.     Diana  MD [facility]

## 2025-01-29 ENCOUNTER — OFFICE VISIT (OUTPATIENT)
Dept: PODIATRY | Facility: CLINIC | Age: 70
End: 2025-01-29
Payer: COMMERCIAL

## 2025-01-29 VITALS — BODY MASS INDEX: 20.22 KG/M2 | HEIGHT: 72 IN | WEIGHT: 149.25 LBS

## 2025-01-29 DIAGNOSIS — B35.3 TINEA PEDIS, UNSPECIFIED LATERALITY: Primary | ICD-10-CM

## 2025-01-29 DIAGNOSIS — L84 CORN OR CALLUS: ICD-10-CM

## 2025-01-29 PROCEDURE — 99999 PR PBB SHADOW E&M-EST. PATIENT-LVL III: CPT | Mod: PBBFAC,,, | Performed by: PODIATRIST

## 2025-01-29 PROCEDURE — 99203 OFFICE O/P NEW LOW 30 MIN: CPT | Mod: S$GLB,,, | Performed by: PODIATRIST

## 2025-01-29 RX ORDER — KETOCONAZOLE 20 MG/G
CREAM TOPICAL DAILY
Qty: 60 G | Refills: 3 | Status: SHIPPED | OUTPATIENT
Start: 2025-01-29

## 2025-01-29 NOTE — PROGRESS NOTES
Subjective:     Patient ID: Prasanna Scott is a 69 y.o. male.    Chief Complaint: No chief complaint on file.    Prasanna is a 69 y.o. male who presents to the podiatry clinic  with complaint of  bilateral foot pain. Onset of the symptoms was several weeks ago. Precipitating event: none known. Current symptoms include: ability to bear weight, but with some pain. Aggravating factors: any weight bearing. Symptoms have progressed to a point and plateaued. Patient has had no prior foot problems. Evaluation to date: none.     Review of Systems   Constitutional: Negative for chills.   Cardiovascular:  Negative for chest pain and claudication.   Respiratory:  Negative for cough.    Skin:  Positive for color change, dry skin and nail changes.   Musculoskeletal:  Positive for joint pain.   Gastrointestinal:  Negative for nausea.   Neurological:  Positive for paresthesias. Negative for numbness.   Psychiatric/Behavioral:  The patient is not nervous/anxious.         Objective:     Physical Exam  Constitutional:       Appearance: He is well-developed.      Comments: Oriented to time, place, and person.   Cardiovascular:      Comments: DP and PT pulses are palpable bilaterally. 3 sec capillary refill time and toes and feet are warm to touch proximally .  There is  hair growth on the feet and toes b/l. There is no edema b/l. No spider veins or varicosities present b/l.     Musculoskeletal:      Comments: Equinus noted b/l ankles with < 10 deg DF noted. MMT 5/5 in DF/PF/Inv/Ev resistance with no reproduction of pain in any direction. Passive range of motion of ankle and pedal joints is painless b/l.     Feet:      Right foot:      Skin integrity: No callus or dry skin.      Left foot:      Skin integrity: No callus or dry skin.   Lymphadenopathy:      Comments: Negative lymphadenopathy bilateral popliteal fossa and tarsal tunnel.   Skin:     Comments: No open lesions, lacerations or wounds noted.Interdigital spaces clean, dry and  intact b/l. No erythema noted to b/l foot.    Focal hyperkeratotic lesion consisting entirely of hyperkeratotic tissue without open skin, drainage, pus, fluctuance, malodor, or signs of infection: B/L forefoot.     Scaling dryness in a moccasin distribution is noted to the bilateral lower extremities with associated erythema.             Neurological:      Mental Status: He is alert.      Comments: Light touch, proprioception, and sharp/dull sensation are all intact bilaterally. Protective threshold with the Callaway-Wienstein monofilament is intact bilaterally.    Psychiatric:         Behavior: Behavior is cooperative.           Assessment:      Encounter Diagnoses   Name Primary?    [place] or callus     Tinea pedis, unspecified laterality Yes     Plan:     Diagnoses and all orders for this visit:    Tinea pedis, unspecified laterality    [place] or callus  -     Ambulatory referral/consult to Podiatry    Other orders  -     ketoconazole (NIZORAL) 2 % cream; Apply topically once daily.      I counseled the patient on his conditions, their implications and medical management.      Ketoconazole 2% topical cream prescribed for treatment of aforementioned tinea pedis. Patient will use this medication as directed in addition to thourougly drying between toes daily, and applying powder as needed    With the patient's verbal consent a sterile #15 scalpel was used to trim the hyperkeratotic lesion described above. She tolerated the procedure well without complication.    Discussed conservative treatment with shoes of adequate dimensions, material, and style to alleviate symptoms and delay or prevent surgical intervention.     Metatarsal pads dispensed    RTC PRN

## 2025-01-31 ENCOUNTER — TELEPHONE (OUTPATIENT)
Dept: OTOLARYNGOLOGY | Facility: CLINIC | Age: 70
End: 2025-01-31
Payer: COMMERCIAL

## 2025-01-31 DIAGNOSIS — Z85.828 PERSONAL HISTORY OF OTHER MALIGNANT NEOPLASM OF SKIN: Primary | ICD-10-CM

## 2025-01-31 NOTE — TELEPHONE ENCOUNTER
Called patient to find out why he is needing to be seen referral on states general abnormal finding. No notes from provide that states what is abnormal.

## 2025-02-12 ENCOUNTER — TELEPHONE (OUTPATIENT)
Dept: GASTROENTEROLOGY | Facility: CLINIC | Age: 70
End: 2025-02-12
Payer: COMMERCIAL

## 2025-02-12 DIAGNOSIS — Z12.11 COLON CANCER SCREENING: Primary | ICD-10-CM

## 2025-02-19 ENCOUNTER — OFFICE VISIT (OUTPATIENT)
Dept: OTOLARYNGOLOGY | Facility: CLINIC | Age: 70
End: 2025-02-19
Payer: COMMERCIAL

## 2025-02-19 VITALS
WEIGHT: 149.25 LBS | SYSTOLIC BLOOD PRESSURE: 150 MMHG | HEIGHT: 72 IN | BODY MASS INDEX: 20.22 KG/M2 | DIASTOLIC BLOOD PRESSURE: 82 MMHG

## 2025-02-19 DIAGNOSIS — J30.2 SEASONAL ALLERGIC RHINITIS, UNSPECIFIED TRIGGER: ICD-10-CM

## 2025-02-19 DIAGNOSIS — H61.23 BILATERAL IMPACTED CERUMEN: ICD-10-CM

## 2025-02-19 DIAGNOSIS — R09.81 CHRONIC NASAL CONGESTION: ICD-10-CM

## 2025-02-19 DIAGNOSIS — K13.79 MUCOCELE OF MOUTH: ICD-10-CM

## 2025-02-19 DIAGNOSIS — J34.3 HYPERTROPHY OF INFERIOR NASAL TURBINATE: ICD-10-CM

## 2025-02-19 DIAGNOSIS — Z87.09 H/O NASAL POLYP: Primary | ICD-10-CM

## 2025-02-19 DIAGNOSIS — J35.8 TONSILLOLITH: ICD-10-CM

## 2025-02-19 RX ORDER — AZELASTINE 1 MG/ML
1 SPRAY, METERED NASAL 2 TIMES DAILY
Qty: 30 ML | Refills: 3 | Status: SHIPPED | OUTPATIENT
Start: 2025-02-19

## 2025-02-19 RX ORDER — IBUPROFEN 800 MG/1
800 TABLET ORAL
COMMUNITY
Start: 2025-01-03

## 2025-02-19 RX ORDER — FLUTICASONE PROPIONATE 50 MCG
2 SPRAY, SUSPENSION (ML) NASAL 2 TIMES DAILY
Qty: 18.2 ML | Refills: 3 | Status: SHIPPED | OUTPATIENT
Start: 2025-02-19

## 2025-02-19 RX ORDER — AMOXICILLIN 500 MG/1
500 CAPSULE ORAL 3 TIMES DAILY
COMMUNITY
Start: 2025-02-03

## 2025-02-19 RX ORDER — CYCLOBENZAPRINE HCL 10 MG
10 TABLET ORAL 3 TIMES DAILY
COMMUNITY
Start: 2025-01-18
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
## 2025-04-03 ENCOUNTER — TELEPHONE (OUTPATIENT)
Dept: ENDOSCOPY | Facility: HOSPITAL | Age: 70
End: 2025-04-03

## 2025-04-03 ENCOUNTER — CLINICAL SUPPORT (OUTPATIENT)
Dept: ENDOSCOPY | Facility: HOSPITAL | Age: 70
End: 2025-04-03
Attending: INTERNAL MEDICINE
Payer: COMMERCIAL

## 2025-04-03 VITALS — BODY MASS INDEX: 20.32 KG/M2 | HEIGHT: 72 IN | WEIGHT: 150 LBS

## 2025-04-03 DIAGNOSIS — Z12.11 COLON CANCER SCREENING: ICD-10-CM

## 2025-04-03 DIAGNOSIS — Z12.11 SPECIAL SCREENING FOR MALIGNANT NEOPLASMS, COLON: Primary | ICD-10-CM

## 2025-04-03 RX ORDER — SODIUM, POTASSIUM,MAG SULFATES 17.5-3.13G
1 SOLUTION, RECONSTITUTED, ORAL ORAL DAILY
Qty: 1 KIT | Refills: 0 | Status: SHIPPED | OUTPATIENT
Start: 2025-04-03 | End: 2025-04-05

## 2025-04-03 NOTE — TELEPHONE ENCOUNTER
Colonoscopy Procedure Prep Instructions      Date of procedure: 5/23/25 Arrive at: 7:15AM    Location of Department:   [facility] [address] [place] [address]  Take the Elevators to 2nd Floor Endoscopy Procedural Area    As soon as possible:   your prep from pharmacy and over the counter DULCOLAX LAXATIVE TABLETS     On the day before your procedure   What You CAN do:   You may have clear liquids ONLY -see below for list.     Liquids That Are OK to Drink:   Water  Sports drinks (Gatorade, Power-Aid)  Coffee or tea (no cream or nondairy creamer)  Clear juices without pulp (apple, white grape)  Gelatin desserts (no fruit or toppings)  Clear soda (sprite, coke, ginger ale)  Chicken broth (until 12 midnight the night before procedure)      What You CANNOT do:   Do not EAT solid food, drink milk or anything   colored red.  Do not drink alcohol.  Do not take oral medications within 1 hour of starting   each dose of SUPREP.  No gum chewing or candy morning of procedure.      Note:   (Please disregard the insert instructions from pharmacy).  SUPREP Bowel Prep Kit is indicated for cleansing of the colon as a preparation for colonoscopy in adults.   Be sure to tell your doctor about all the medicines you take, including prescription and non-prescription medicines, vitamins, and herbal supplements. SUPREP Bowel Prep Kit may affect how other medicines work.  Medication taken by mouth may not be absorbed properly when taken within 1 hour before the start of each dose of SUPREP Bowel Prep Kit.    It is not uncommon to experience some abdominal cramping, nausea and/or vomiting when taking the prep. If you have nausea and/or vomiting while taking the prep, stop drinking for 20 to 30 minutes then continue.    How to take prep:    SUPREP Bowel Prep Kit is a (2-day) prep.   Both 6-ounce bottles are required for a complete preparation for colonoscopy. Dilute the solution concentrate as directed  prior to use. You must drink water with each dose of SUPREP, and additional water after each dose.    DOSE 1--Day Before Colonoscopy 5/22/25    Drink at least 6 to 8 glasses of clear liquids from time you wake up until you begin your prep and then continue until bedtime to avoid dehydration.     12:00 pm (NOON) Take four (4) Dulcolax (Bisacodyl) tablets with at least 8 ounces or more of clear liquids.      6:00 pm:    You must complete Steps 1 through 4 using one (1) 6-ounce bottle before going to bed as shown below:    Step 1-Pour ONE (1) 6-ounce bottle of SUPREP liquid into the mixing container.  Step 2-Add cool drinking water to the 16-ounce line on the container and mix.  Step 3-Drink ALL the liquid in the container.  Step 4-You must drink two (2) more 16-ounce containers of water over the next 1 hour.  IMPORTANT: If you experience preparation-related symptoms (for example, nausea, bloating, or cramping), stop, or slow the rate of drinking the additional water until your symptoms decrease.    DOSE 2--Day of the Colonoscopy 5/23/25 at 2-3 AM.    For this dose, repeat Steps 1 through 4 shown above using the other 6-ounce bottle.   You may continue drinking water/clear liquids until   4 hours before your colonoscopy or as directed by the scheduling nurse  5:15AM.    For information about your procedure, two (2) things to view prior to colonoscopy:  Please watch this informational video. It is important to watch this animated consent video prior to your arrival. If you haven't watched the video prior to arriving, you are required to watch it during admission which can causes delays.    Options for viewing:   Using a keyboard:  press and hold the control tab (Ctrl) and left mouse click to follow links.           Colonoscopy Instructional Video                                                                                   OR    Type link address into your web browser's address  bar:  [email or web address]      Educational Booklet with pictures:      Colonoscopy Prep - Liquid      Comments:           IMPORTANT INFORMATION TO KNOW BEFORE YOUR PROCEDURE    [facility] 2nd Floor       When you arrive:  If your procedure is Monday - Friday 5am - 7pm - Please enter through the front door near [facility]. Please proceed up the first set of elevators to the 2nd floor where you will check in at the endo registration desk.     If your procedure is on a Saturday (weekend), enter through the back Outpatient entrance. Please note this entrance is diagonal from the Emergency Department entrance.              If your procedure requires the administration of anesthesia, it is necessary for a responsible adult to drive you home. (Medical Transportation, Uber, Lyft, Taxi, etc. may ONLY be used if a responsible adult is present to accompany you home.  The responsible adult CAN'T be the  of the service).      person must be available to return to pick you up within 15 minutes of being notified of discharge.       Please bring a picture ID, insurance card, & copayment      Take Medications as directed below:    If you begin taking any blood thinning medications, injectable weight loss/diabetes medications (other than insulin) , or Adipex (Phentermine) please contact the endoscopy scheduling department listed below as soon as possible.    If you are diabetic see the attached instruction sheet regarding your medication.     If you take HEART, BLOOD PRESSURE, SEIZURE, PAIN, LUNG (including inhalers/nebulizers), ANTI-REJECTION (transplant patients), or PSYCHIATRIC medications, please take at your regular times with a sip of water or as directed by the scheduling nurse.     Important contact information:    Endoscopy [phone] Hours of operation Monday-Friday 8:00-4:30pm.    Questions about insurance or financial obligations call [phone] or [phone].    If you have questions regarding the prep or need to reschedule, please call [phone]. After hours questions requiring immediate assistance, contact [facility] nurse line at [phone] or [phone].   NOTE:     On occasion, unforeseen circumstances may cause a delay in your procedure start time. We respect your time and appreciate your patience during these circumstances.      Comments:

## 2025-04-03 NOTE — TELEPHONE ENCOUNTER
Referral for procedure from PAT appointment      Spoke to patient to schedule procedure(s) Colonoscopy       Physician to perform procedure(s) Dr. SHIRLEY Colmenares  Date of Procedure (s) 5/23/25  Arrival Time 7:15 AM  Time of Procedure(s) 8:15 AM   Location of Procedure(s) [address]   Type of Rx Prep sent to patient: Suprep  Instructions provided to patient via Postal Mail    Patient was informed on the following information and verbalized understanding. Screening questionnaire reviewed with patient and complete. If procedure requires anesthesia, a responsible adult needs to be present to accompany the patient home, patient cannot drive after receiving anesthesia. Appointment details are tentative, especially check-in time. Patient will receive a prep-op call 7 days prior to confirm check-in time for procedure. If applicable the patient should contact their pharmacy to verify Rx for procedure prep is ready for pick-up. Patient was advised to call the scheduling department at [phone] if pharmacy states no Rx is available. Patient was advised to call the endoscopy scheduling department if any questions or concerns arise.      [facility] Endoscopy Scheduling Department

## 2025-05-06 DIAGNOSIS — L84 CORNS AND CALLOSITIES: Primary | ICD-10-CM

## 2025-05-20 ENCOUNTER — OFFICE VISIT (OUTPATIENT)
Dept: DERMATOLOGY | Facility: CLINIC | Age: 70
End: 2025-05-20
Payer: COMMERCIAL

## 2025-05-20 DIAGNOSIS — L82.1 SK (SEBORRHEIC KERATOSIS): ICD-10-CM

## 2025-05-20 DIAGNOSIS — L72.0 MILIA: ICD-10-CM

## 2025-05-20 DIAGNOSIS — Z85.828 PERSONAL HISTORY OF OTHER MALIGNANT NEOPLASM OF SKIN: ICD-10-CM

## 2025-05-20 DIAGNOSIS — D48.5 NEOPLASM OF UNCERTAIN BEHAVIOR OF SKIN: Primary | ICD-10-CM

## 2025-05-20 PROCEDURE — 99203 OFFICE O/P NEW LOW 30 MIN: CPT | Mod: 25,S$GLB,, | Performed by: DERMATOLOGY

## 2025-05-20 PROCEDURE — 11104 PUNCH BX SKIN SINGLE LESION: CPT | Mod: S$GLB,,, | Performed by: DERMATOLOGY

## 2025-05-20 PROCEDURE — 11103 TANGNTL BX SKIN EA SEP/ADDL: CPT | Mod: S$GLB,,, | Performed by: DERMATOLOGY

## 2025-05-20 PROCEDURE — 99999 PR PBB SHADOW E&M-EST. PATIENT-LVL III: CPT | Mod: PBBFAC,,, | Performed by: DERMATOLOGY

## 2025-05-20 PROCEDURE — 88305 TISSUE EXAM BY PATHOLOGIST: CPT | Mod: TC | Performed by: DERMATOLOGY

## 2025-05-20 NOTE — PROGRESS NOTES
Subjective:      Patient ID:  Prasanna Scott is a 70 y.o. male who presents for   Chief Complaint   Patient presents with    Skin Check     TBSE      Patient here for Total Body Skin Exam    Last seen by dermatologist: Few years ago, [facility] on [address]      No - personal history of atypical moles removed  No - personal history of MM   No - family history of MM  Yes - childhood blistering sunburns  No - tanning bed use  Yes has a h/o of BCC on left temple  - personal history of NMSC    Patient with specific complaint of lesion(s)  Location: right nasal sidewall   Duration: couple of years - reoccurring   Symptoms: raised   Relieving factors/Previous treatments: none     Patient with new area of concern:   Location: lower mid back +itchy, tender   Previous treatments: none      Patient with new area of concern:   Location: groin +raised   Previous treatments: none              Review of Systems   Skin:  Negative for daily sunscreen use, activity-related sunscreen use, recent sunburn and wears hat.   Hematologic/Lymphatic: Bruises/bleeds easily (on asa).       Objective:   Physical Exam   Constitutional: He appears well-developed and well-nourished. No distress.   Genitourinary:         Neurological: He is alert and oriented to person, place, and time. He is not disoriented.   Psychiatric: He has a normal mood and affect.   Skin:   Areas Examined (abnormalities noted in diagram):   Scalp / Hair Palpated and Inspected  Head / Face Inspection Performed  Neck Inspection Performed  Chest / Axilla Inspection Performed  Abdomen Inspection Performed  Genitals / Buttocks / Groin Inspection Performed  Back Inspection Performed  RUE Inspected  LUE Inspection Performed  RLE Inspected  LLE Inspection Performed  Nails and Digits Inspection Performed                 Diagram Legend     Erythematous scaling macule/papule c/w actinic keratosis       Vascular papule c/w angioma      Pigmented verrucoid papule/plaque  c/w seborrheic keratosis      Yellow umbilicated papule c/w sebaceous hyperplasia      Irregularly shaped tan macule c/w lentigo     1-2 mm smooth white papules consistent with Milia      Movable subcutaneous cyst with punctum c/w epidermal inclusion cyst      Subcutaneous movable cyst c/w pilar cyst      Firm pink to brown papule c/w dermatofibroma      Pedunculated fleshy papule(s) c/w skin tag(s)      Evenly pigmented macule c/w junctional nevus     Mildly variegated pigmented, slightly irregular-bordered macule c/w mildly atypical nevus      Flesh colored to evenly pigmented papule c/w intradermal nevus       Pink pearly papule/plaque c/w basal cell carcinoma      Erythematous hyperkeratotic cursted plaque c/w SCC      Surgical scar with no sign of skin cancer recurrence      Open and closed comedones      Inflammatory papules and pustules      Verrucoid papule consistent consistent with wart     Erythematous eczematous patches and plaques     Dystrophic onycholytic nail with subungual debris c/w onychomycosis     Umbilicated papule    Erythematous-base heme-crusted tan verrucoid plaque consistent with inflamed seborrheic keratosis     Erythematous Silvery Scaling Plaque c/w Psoriasis     See annotation                                Assessment / Plan:      Pathology Orders:       Normal Orders This Visit    Specimen to Pathology, Dermatology     Questions:    Procedure Type: Dermatology and skin neoplasms    Number of Specimens: 4    ------------------------: -------------------------    Spec 1 Procedure: Shave Biopsy    Spec 1 Clinical Impression: r/o superficial bcc    Spec 1 Source: right lower back    ------------------------: -------------------------    Spec 2 Procedure: Shave Biopsy    Spec 2 Clinical Impression: r/o bcc    Spec 2 Source: right upper back    ------------------------: -------------------------    Spec 3 Procedure: Shave Biopsy    Spec 3 Clinical Impression: r/o pigmented bcc vs mm vs sk     Spec 3 Source: right medial canthus    ------------------------: -------------------------    Spec 4 Procedure: Punch Biopsy    Spec 4 Clinical Impression: r/o recurrent bcc vs scar vs other    Spec 4 Source: left temple    Clinical Information: see EPIC    Clinical History: see EPIC    Specimen Source: Skin    Release to patient: Immediate          Neoplasm of uncertain behavior of skin  -     Specimen to Pathology, Dermatology    Shave biopsy procedure note:    Shave biopsy x 3 performed after verbal consent including risk of infection, scar, recurrence, need for additional treatment of site. Area prepped with alcohol, anesthetized with approximately 1.0cc of 1% lidocaine with epinephrine. Lesional tissue shaved with razor blade. Hemostasis achieved with application of aluminum chloride followed by hyfrecation. No complications. Dressing applied. Wound care explained.    Punch biopsy procedure note:  Punch biopsy performed after verbal consent obtained. Area marked and prepped with alcohol. Approximately 1cc of 1% lidocaine with epinephrine injected. 3 mm disposable punch used to remove lesion. Hemostasis obtained and biopsy site closed with 1 - 2 Prolene sutures. Wound care instructions reviewed with patient and handout given.      SK (seborrheic keratosis)  These are benign inherited growths without a malignant potential. Reassurance given to patient. No treatment is necessary.       Milia   - minor problem and chronic.   Reassurance given to patient. No treatment necessary.     Personal history of other malignant neoplasm of skin  -     Ambulatory referral/consult to Dermatology  Area(s) of previous NMSC evaluated with no signs of recurrence.    Upper body skin examination performed today including at least 6 points as noted in physical examination. Suspicious lesions noted.    Recommend daily sun protection/avoidance and use of at least SPF 30, broad spectrum sunscreen (OTC drug).                No  follow-ups on file.

## 2025-05-20 NOTE — PATIENT INSTRUCTIONS
Shave Biopsy Wound Care    Your doctor has performed a shave biopsy today.  A band aid and vaseline ointment has been placed over the site.  This should remain in place for NO LONGER THAN 48 hours.  It is fine to remove the bandaid after 24 hours, if the area is no longer bleeding. It is recommended that you keep the area dry (do not wet)) for the first 24 hours.  After 24 hours, wash the area with warm soap and water and apply Vaseline jelly.  Many patients prefer to use Neosporin or Bacitracin ointment.  This is acceptable; however, know that you can develop an allergy to this medication even if you have used it safely for years.  It is important to keep the area moist.  Letting it dry out and get air slows healing time, and will worsen the scar.        If you notice increasing redness, tenderness, pain, or yellow drainage at the biopsy site, please notify your doctor.  These are signs of an infection.    If your biopsy site is bleeding, apply firm pressure for 15 minutes straight.  Repeat for another 15 minutes, if it is still bleeding.   If the surgical site continues to bleed, then please contact your doctor.      For [facility] users:   You will receive your biopsy results in [facility] as soon as they are available. Please be assured that your physician/provider will review your results and will then determine what further treatment, evaluation, or planning is required. You should be contacted by your physician's/provider's office within 5 business days of receiving your results; If not, please reach out to directly. This is one more way [facility] you [facility].     [facility] [place] [address]/ [phone] FAX/ [email or web address]       Punch Biopsy Wound Care    Your doctor has performed a punch biopsy today.  A band aid and antibiotic ointment has been placed over the site.  This should remain in place for 24 hours.  It is recommended that you keep the area dry for the  first 24 hours.  After 24 hours, you may remove the band aid and wash the area with warm soap and water and apply Vaseline jelly.  Many patients prefer to use Neosporin or Bacitracin ointment.  This is acceptable; however know that you can develop an allergy to this medication even if you have used it safely for years.  It is important to keep the area moist.  Letting it dry out and get air slows healing time, will worsen the scar, and make it more difficult to remove the stitches if they were placed.  Band aid is optional after first 24 hours.      If you notice increasing redness, tenderness, pain, or yellow drainage at the biopsy or surgical site, please notify your doctor.  These are signs of an infection.    If your biopsy/surgical site is bleeding, apply firm pressure for 15 minutes straight.  Repeat for another 15 minutes, if it is still bleeding.   If the surgical site continues to bleed, then please contact your doctor.      For [facility] users:   You will receive your biopsy results in [facility] as soon as they are available. Please be assured that your physician/provider will review your results and will then determine what further treatment, evaluation, or planning is required. You should be contacted by your physician's/provider's office within 5 business days of receiving your results; If not, please reach out to directly. This is one more way [facility] is putting you first.       [address] [place] [address]/ [phone] FAX/ [email or web address]          SEBORRHEIC KERATOSES        What causes seborrheic keratoses?    Seborrheic keratoses are harmless, common skin growths that first appear during adult life.  As time goes by, more growths appear.  Some persons have a very large number of them.  Seborrheic keratoses appear on both covered and uncovered parts of the body; they are not caused by sunlight.  The tendency to develop seborrheic keratoses is  inherited.    Seborrheic keratoses are harmless and never become malignant.  They begin as slightly raised, light brown spots.  Gradually they thicken and take on a rough wartlike surface.  They slowly darken and may turn black.  These color changes are harmless.  Seborrheic keratoses are superficial and look as if they were stuck on the skin.  Persons who have had several seborrheic keratoses can usually recognize this type of benign growth.  However, if you are concerned or unsure about any growth, consult me.    Treatment    Seborrheic keratoses can easily be removed in the office.  The only reason for removing a seborrheic keratosis is your wish to get rid of it.

## 2025-05-22 ENCOUNTER — TELEPHONE (OUTPATIENT)
Dept: DERMATOLOGY | Facility: CLINIC | Age: 70
End: 2025-05-22
Payer: COMMERCIAL

## 2025-05-22 ENCOUNTER — RESULTS FOLLOW-UP (OUTPATIENT)
Dept: DERMATOLOGY | Facility: CLINIC | Age: 70
End: 2025-05-22
Payer: COMMERCIAL

## 2025-05-22 LAB
DHEA SERPL-MCNC: NORMAL
ESTROGEN SERPL-MCNC: NORMAL PG/ML
INSULIN SERPL-ACNC: NORMAL U[IU]/ML
LAB AP CLINICAL INFORMATION: NORMAL
LAB AP GROSS DESCRIPTION: NORMAL
LAB AP REPORT FOOTNOTES: NORMAL
T3RU NFR SERPL: NORMAL %

## 2025-05-22 NOTE — TELEPHONE ENCOUNTER
Patient is now scheduled for Mohs surgery on 6/18/25 at 7:50 for a BCC, right medial canthus. Referral from Dr. Rosita Pereira. Patient confirmed date and time. Patient stated he did not want a consult that he just wanted to get procedure done.

## 2025-05-22 NOTE — TELEPHONE ENCOUNTER
----- Message from Roby Galvez sent at 5/21/2025  4:40 PM CDT -----  Regarding: FW: Urgent Consult/Advisory  Contact: Consult/Advisory    ----- Message -----  From: Tho Mcadams  Sent: 5/21/2025  12:12 PM CDT  To: [facility] Becker Staff  Subject: Urgent Consult/Advisory                          Consult/Advisory Name Of Caller:pt  Contact Preference:[phone]  Nature of call:  pt wants to know if he needs to cover areas where tags were removed after placing vaseline in order to go to the store. Please call to advise thank you

## 2025-05-22 NOTE — PROGRESS NOTES
Final Diagnosis   1. Skin, right lower back, shave biopsy:  -BASAL CELL CARCINOMA, SUPERFICIAL TYPE, EXTENDING TO A PERIPHERAL BIOPSY EDGE     ED and C     2. Skin, right upper back, shave biopsy:  -BASAL CELL CARCINOMA (PIGMENTED), NODULAR TYPE, FOCALLY EXTENDING TO THE DEEP BIOPSY EDGE     Procx - JAM     3. Skin, right medial canthus, shave biopsy:  -BASAL CELL CARCINOMA (PIGMENTED), NODULAR TYPE, EXTENDING TO THE PERIPHERAL AND DEEP BIOPSY EDGES     SS[facility] for Mohs     4. Skin, left temple, punch biopsy:   -ECCRINE SPIRADENOMA  This is a benign lesion. No further treatment is necessary.          Recommend patient to Dr. Martínez for Mohs surgery consultation x 1 right medial canthus. Picture in chart.     General derm team: Sent patient for Mohs surgery. Please schedule procx and ED and C as above. F/u with me in 6 months.

## 2025-05-23 ENCOUNTER — ANESTHESIA (OUTPATIENT)
Dept: ENDOSCOPY | Facility: HOSPITAL | Age: 70
End: 2025-05-23
Payer: COMMERCIAL

## 2025-05-23 ENCOUNTER — TELEPHONE (OUTPATIENT)
Dept: PODIATRY | Facility: CLINIC | Age: 70
End: 2025-05-23
Payer: COMMERCIAL

## 2025-05-23 ENCOUNTER — ANESTHESIA EVENT (OUTPATIENT)
Dept: ENDOSCOPY | Facility: HOSPITAL | Age: 70
End: 2025-05-23
Payer: COMMERCIAL

## 2025-05-23 ENCOUNTER — HOSPITAL ENCOUNTER (OUTPATIENT)
Facility: HOSPITAL | Age: 70
Discharge: HOME | End: 2025-05-23
Attending: STUDENT IN AN ORGANIZED HEALTH CARE EDUCATION/TRAINING PROGRAM | Admitting: STUDENT IN AN ORGANIZED HEALTH CARE EDUCATION/TRAINING PROGRAM
Payer: COMMERCIAL

## 2025-05-23 DIAGNOSIS — Z12.11 ENCOUNTER FOR SCREENING COLONOSCOPY: ICD-10-CM

## 2025-05-23 DIAGNOSIS — Z12.11 COLON CANCER SCREENING: ICD-10-CM

## 2025-05-23 PROCEDURE — 25000003 PHARM REV CODE 250: Performed by: STUDENT IN AN ORGANIZED HEALTH CARE EDUCATION/TRAINING PROGRAM

## 2025-05-23 PROCEDURE — 45385 COLONOSCOPY W/LESION REMOVAL: CPT | Mod: PT,,, | Performed by: STUDENT IN AN ORGANIZED HEALTH CARE EDUCATION/TRAINING PROGRAM

## 2025-05-23 PROCEDURE — 63600175 PHARM REV CODE 636 W HCPCS: Performed by: STUDENT IN AN ORGANIZED HEALTH CARE EDUCATION/TRAINING PROGRAM

## 2025-05-23 PROCEDURE — 37000009 HC ANESTHESIA EA ADD 15 MINS: Performed by: STUDENT IN AN ORGANIZED HEALTH CARE EDUCATION/TRAINING PROGRAM

## 2025-05-23 PROCEDURE — 37000008 HC ANESTHESIA 1ST 15 MINUTES: Performed by: STUDENT IN AN ORGANIZED HEALTH CARE EDUCATION/TRAINING PROGRAM

## 2025-05-23 PROCEDURE — 45385 COLONOSCOPY W/LESION REMOVAL: CPT | Mod: 33 | Performed by: STUDENT IN AN ORGANIZED HEALTH CARE EDUCATION/TRAINING PROGRAM

## 2025-05-23 PROCEDURE — 88305 TISSUE EXAM BY PATHOLOGIST: CPT | Mod: TC | Performed by: STUDENT IN AN ORGANIZED HEALTH CARE EDUCATION/TRAINING PROGRAM

## 2025-05-23 PROCEDURE — 27201089 HC SNARE, DISP (ANY): Performed by: STUDENT IN AN ORGANIZED HEALTH CARE EDUCATION/TRAINING PROGRAM

## 2025-05-23 RX ORDER — PROPOFOL 10 MG/ML
VIAL (ML) INTRAVENOUS
Status: DISCONTINUED | OUTPATIENT
Start: 2025-05-23 | End: 2025-05-23

## 2025-05-23 RX ORDER — LIDOCAINE HYDROCHLORIDE 20 MG/ML
INJECTION, SOLUTION EPIDURAL; INFILTRATION; INTRACAUDAL; PERINEURAL
Status: DISCONTINUED
Start: 2025-05-23 | End: 2025-05-23 | Stop reason: HOSPADM

## 2025-05-23 RX ORDER — PROPOFOL 10 MG/ML
VIAL (ML) INTRAVENOUS
Status: DISCONTINUED
Start: 2025-05-23 | End: 2025-05-23 | Stop reason: HOSPADM

## 2025-05-23 RX ORDER — PHENYLEPHRINE HCL IN 0.9% NACL 1 MG/10 ML
SYRINGE (ML) INTRAVENOUS
Status: DISCONTINUED
Start: 2025-05-23 | End: 2025-05-23 | Stop reason: HOSPADM

## 2025-05-23 RX ORDER — LIDOCAINE HYDROCHLORIDE 20 MG/ML
INJECTION INTRAVENOUS
Status: DISCONTINUED | OUTPATIENT
Start: 2025-05-23 | End: 2025-05-23

## 2025-05-23 RX ADMIN — PROPOFOL 50 MG: 10 INJECTION, EMULSION INTRAVENOUS at 09:05

## 2025-05-23 RX ADMIN — PROPOFOL 20 MG: 10 INJECTION, EMULSION INTRAVENOUS at 09:05

## 2025-05-23 RX ADMIN — PROPOFOL 40 MG: 10 INJECTION, EMULSION INTRAVENOUS at 09:05

## 2025-05-23 RX ADMIN — SODIUM CHLORIDE: 0.9 INJECTION, SOLUTION INTRAVENOUS at 08:05

## 2025-05-23 RX ADMIN — PROPOFOL 80 MG: 10 INJECTION, EMULSION INTRAVENOUS at 09:05

## 2025-05-23 RX ADMIN — LIDOCAINE HYDROCHLORIDE 100 MG: 20 INJECTION, SOLUTION INTRAVENOUS at 09:05

## 2025-05-23 RX ADMIN — PROPOFOL 30 MG: 10 INJECTION, EMULSION INTRAVENOUS at 09:05

## 2025-05-23 NOTE — ANESTHESIA PREPROCEDURE EVALUATION
05/23/2025  Prasanna Scott is a 70 y.o., male.  To undergo Procedure(s) (LRB):  COLONOSCOPY, SCREENING, LOW RISK PATIENT (N/A)     Denies CP/SOB/GERD/MI/CVA/URI symptoms.  METS > 4  NPO > 8    Past Medical History:  Past Medical History:   Diagnosis Date    Arthritis        Past Surgical History:  Past Surgical History:   Procedure Laterality Date    arm surgury      CARPAL TUNNEL RELEASE      neck surgury      TIBIA FRACTURE SURGERY      ULNAR NERVE TRANSPOSITION         Social History:  Social History[1]    Medications:  Medications Ordered Prior to Encounter[2]    Allergies:  Review of patient's allergies indicates:  No Known Allergies    Active Problems:  Problem List[3]    Diagnostic Studies:   Latest Reference Range & Units 02/28/24 04:35   WBC 3.90 - 12.70 K/uL 6.78   RBC 4.60 - 6.20 M/uL 3.96 (L)   Hemoglobin 14.0 - 18.0 g/dL 12.5 (L)   Hematocrit 40.0 - 54.0 % 37.9 (L)   MCV 82 - 98 fL 96   MCH 27.0 - 31.0 pg 31.6 (H)   MCHC 32.0 - 36.0 g/dL 33.0   RDW 11.5 - 14.5 % 11.9   Platelet Count 150 - 450 K/uL 286      Latest Reference Range & Units 02/28/24 04:35   Sodium 136 - 145 mmol/L 131 (L)   Potassium 3.5 - 5.1 mmol/L 4.8   Chloride 95 - 110 mmol/L 101   CO2 23 - 29 mmol/L 24   Anion Gap 8 - 16 mmol/L 6 (L)   BUN 8 - 23 mg/dL 15   Creatinine 0.5 - 1.4 mg/dL 0.8   eGFR >60 mL/min/1.73 m^2 >60     EKG (1/14/25):  Sinus rhythm with Premature atrial complexes     TTE (2/27/24):    Left Ventricle: There is normal systolic function with a visually estimated ejection fraction of 55 - 60%. There is normal diastolic function.    Right Ventricle: Normal right ventricular cavity size. Systolic function is normal.    Aorta: Aortic root is mildly dilated measuring 3.87 cm. Ascending aorta is mildly dilated measuring 3.66 cm.    No intracardiac source of embolus noted on this exam.  If high clinical suspicion,  consider MICHAEL +/- bubble study.    24 Hour Vitals:      See Nursing Charting For Additional Vitals      Pre-op Assessment    I have reviewed the Patient Summary Reports.     I have reviewed the Nursing Notes.       Review of Systems  Anesthesia Hx:  No problems with previous Anesthesia               Denies Personal Hx of Anesthesia complications.                    Social:  Former Smoker, Alcohol Use       Hematology/Oncology:       -- Anemia:                                  Cardiovascular:  Exercise tolerance: good   Hypertension           hyperlipidemia   ECG has been reviewed.                            Pulmonary:  Pulmonary Normal                       Hepatic/GI:  Hepatic/GI Normal                    Musculoskeletal:  Arthritis               Neurological:  Neurology Normal                                      Endocrine:  Endocrine Normal                Physical Exam  General: Well nourished and Cooperative    Airway:  Mallampati: II   Mouth Opening: Normal  TM Distance: Normal    Dental:  Intact    Chest/Lungs:  Clear to auscultation, Normal Respiratory Rate    Heart:  Rate: Normal  Rhythm: Regular Rhythm        Anesthesia Plan  Type of Anesthesia, risks & benefits discussed:    Anesthesia Type: Gen ETT, MAC, Gen Natural Airway  Intra-op Monitoring Plan: Standard ASA Monitors  Post Op Pain Control Plan: multimodal analgesia  Induction:  IV  Informed Consent: Informed consent signed with the Patient and all parties understand the risks and agree with anesthesia plan.  All questions answered.   ASA Score: 2    Ready For Surgery From Anesthesia Perspective.     .           [1]   Social History  Socioeconomic History    Marital status: Single   Tobacco Use    Smoking status: Former     Current packs/day: 0.00     Types: Cigarettes     Quit date: 2017     Years since quittin.0    Smokeless tobacco: Never   Substance and Sexual Activity    Alcohol use: Yes     Alcohol/week: 5.0 standard drinks of alcohol      Types: 5 Cans of beer per week    Drug use: No    Sexual activity: Yes     Partners: Female     Social Drivers of Health     Financial Resource Strain: Low Risk  (2/27/2024)    Overall Financial Resource Strain (CARDIA)     Difficulty of Paying Living Expenses: Not hard at all   Food Insecurity: No Food Insecurity (2/27/2024)    Hunger Vital Sign     Worried About Running Out of Food in the Last Year: Never true     Ran Out of Food in the Last Year: Never true   Transportation Needs: No Transportation Needs (2/27/2024)    PRAPARE - Transportation     Lack of Transportation (Medical): No     Lack of Transportation (Non-Medical): No   Physical Activity: Insufficiently Active (2/27/2024)    Exercise Vital Sign     Days of Exercise per Week: 5 days     Minutes of Exercise per Session: 20 min   Stress: No Stress Concern Present (2/27/2024)    [employer] - Occupational Stress Questionnaire     Feeling of Stress : Not at all   Housing Stability: Low Risk  (2/27/2024)    Housing Stability Vital Sign     Unable to Pay for Housing in the Last Year: No     Number of Places Lived in the Last Year: 1     Unstable Housing in the Last Year: No   [2]   No current facility-administered medications on file prior to encounter.     Current Outpatient Medications on File Prior to Encounter   Medication Sig Dispense Refill    aspirin (ECOTRIN) 81 MG EC tablet Take 1 tablet (81 mg total) by mouth once daily. 90 tablet 2    metoprolol tartrate (LOPRESSOR) 25 MG tablet Take 50 mg by mouth 2 (two) times daily.      olmesartan (BENICAR) 40 MG tablet Take 40 mg by mouth once daily.      amoxicillin (AMOXIL) 500 MG capsule Take 500 mg by mouth 3 (three) times daily.      atorvastatin (LIPITOR) 20 MG tablet Take 1 tablet (20 mg total) by mouth every evening. 90 tablet 3    azelastine (ASTELIN) 137 mcg (0.1 %) nasal spray 1 spray (137 mcg total) by Nasal route 2 (two) times daily. 30 mL 3    cyclobenzaprine  (FLEXERIL) 10 MG tablet Take 10 mg by mouth 3 (three) times daily.      ergocalciferol (ERGOCALCIFEROL) 50,000 unit Cap Take 1 capsule by mouth every 7 days.      flu vac qs 2019,4 yr up,CD,PF, (FLUCELVAX QUAD 6059-7143, PF,) 60 mcg (15 mcg x 4)/0.5 mL Syrg Flucelvax Quad 0584-6246 (PF) 60 mcg (15 mcg x 4)/0.5 mL IM syringe   ADM 0.5ML IM UTD      fluticasone propionate (FLONASE) 50 mcg/actuation nasal spray 2 sprays (100 mcg total) by Each Nostril route 2 (two) times daily. 18.2 mL 3    gabapentin (NEURONTIN) 600 MG tablet Take 1 tablet by mouth 3 (three) times daily.      HYDROcodone-acetaminophen (NORCO)  mg per tablet TAKE 1 TABLET BY MOUTH FIVE TIMES DAILY AS NEEDED FOR PAIN      ibuprofen (ADVIL,MOTRIN) 800 MG tablet Take 800 mg by mouth.      ketoconazole (NIZORAL) 2 % cream Apply topically once daily. 60 g 3   [3]   Patient Active Problem List  Diagnosis    Finger pain    Primary osteoarthritis of first carpometacarpal joint of right hand    Unintentional opioid overdose    Arm numbness    Hypertension    Acquired dilation of ascending aorta and aortic root    Other hyperlipidemia

## 2025-05-23 NOTE — PLAN OF CARE
Procedure and recovery completed without complication. Pt AAOx4, in NAD, vitals recovered to baseline. Pt discussed case with MD. Discharge instructions reviewed, pt verbalizes understanding. Pt ambulated off unit with steady gait accompanied by family member.

## 2025-05-23 NOTE — PROVATION PATIENT INSTRUCTIONS
Discharge Summary/Instructions after an Endoscopic Procedure  Patient Name: Prasanna Scott  Patient MRN: [id number]  Patient YOB: 1955  Friday, May 23, 2025  Randy Colmenares MD  Dear patient,  As a result of recent federal legislation (The Federal Cures Act), you may   receive lab or pathology results from your procedure in your [facility]   account before your physician is able to contact you. Your physician or   their representative will relay the results to you with their   recommendations at their soonest availability.  Thank you,  RESTRICTIONS:  During your procedure today, you received medications for sedation.  These   medications may affect your judgment, balance and coordination.  Therefore,   for 24 hours, you have the following restrictions:   - DO NOT drive a car, operate machinery, make legal/financial decisions,   sign important papers or drink alcohol.    ACTIVITY:  Today: no heavy lifting, straining or running due to procedural   sedation/anesthesia.  The following day: return to full activity including work.  DIET:  Eat and drink normally unless instructed otherwise.     TREATMENT FOR COMMON SIDE EFFECTS:  - Mild abdominal pain, nausea, belching, bloating or excessive gas:  rest,   eat lightly and use a heating pad.  - Sore Throat: treat with throat lozenges and/or gargle with warm salt   water.  - Because air was used during the procedure, expelling large amounts of air   from your rectum or belching is normal.  - If a bowel prep was taken, you may not have a bowel movement for 1-3 days.    This is normal.  SYMPTOMS TO WATCH FOR AND REPORT TO YOUR PHYSICIAN:  1. Abdominal pain or bloating, other than gas cramps.  2. Chest pain.  3. Back pain.  4. Signs of infection such as: chills or fever occurring within 24 hours   after the procedure.  5. Rectal bleeding, which would show as bright red, maroon, or black stools.   (A tablespoon of blood from the rectum is not serious, especially if    hemorrhoids are present.)  6. Vomiting.  7. Weakness or dizziness.  GO DIRECTLY TO THE NEAREST EMERGENCY ROOM IF YOU HAVE ANY OF THE FOLLOWING:      Difficulty breathing              Chills and/or fever over 101 F   Persistent vomiting and/or vomiting blood   Severe abdominal pain   Severe chest pain   Black, tarry stools   Bleeding- more than one tablespoon   Any other symptom or condition that you feel may need urgent attention  Your doctor recommends these additional instructions:  If any biopsies were taken, your doctors clinic will contact you in 1 to 2   weeks with any results.  - Discharge patient to home (ambulatory).   - Patient has a contact number available for emergencies.  The signs and   symptoms of potential delayed complications were discussed with the   patient.  Return to normal activities tomorrow.  Written discharge   instructions were provided to the patient.   - Resume previous diet.   - Continue present medications.   - Return to primary care physician as previously scheduled.   - Repeat colonoscopy in 7 years for surveillance. Final recommendation   pending review of pathology.  For questions, problems or results please call your physician - Randy Colmenares MD at Work:  [phone].  [facility] can be reached at [phone]     IF A COMPLICATION OR EMERGENCY SITUATION ARISES AND YOU ARE UNABLE TO REACH   YOUR PHYSICIAN - GO DIRECTLY TO THE EMERGENCY ROOM.  MD Randy Rodríguez MD  5/23/2025 9:33:59 AM  This report has been verified and signed electronically.  Dear patient,  As a result of recent federal legislation (The Federal Cures Act), you may   receive lab or pathology results from your procedure in your [facility]   account before your physician is able to contact you. Your physician or   their representative will relay the results to you with their   recommendations at their soonest availability.  Thank you,  PROVATION

## 2025-05-23 NOTE — ANESTHESIA POSTPROCEDURE EVALUATION
Anesthesia Post Evaluation    Patient: Prasanna Scott    Procedure(s) Performed: Procedure(s) (LRB):  COLONOSCOPY, SCREENING, LOW RISK PATIENT (N/A)    Final Anesthesia Type: general      Patient location during evaluation: GI PACU  Patient participation: Yes- Able to Participate  Level of consciousness: awake and alert and oriented  Post-procedure vital signs: reviewed and stable  Pain management: adequate  Airway patency: patent    PONV status at discharge: No PONV  Anesthetic complications: no      Cardiovascular status: hemodynamically stable and blood pressure returned to baseline  Respiratory status: spontaneous ventilation, room air and unassisted  Hydration status: euvolemic  Follow-up not needed.              Vitals Value Taken Time   /67 05/23/25 09:58   Temp 36.3 °C (97.4 °F) 05/23/25 09:27   Pulse 61 05/23/25 09:58   Resp 15 05/23/25 09:58   SpO2 98 % 05/23/25 09:58         Event Time   Out of Recovery 09:58:00         Pain/Rama Score: Rama Score: 10 (5/23/2025  9:58 AM)

## 2025-05-23 NOTE — H&P
Short Stay Endoscopy History and Physical    PCP - Ruthy Jean Baptiste [facility]    Procedure - Colonoscopy  ASA - per anesthesia  Mallampati - per anesthesia  History of Anesthesia problems - no  Family history Anesthesia problems -  no   Plan of anesthesia - General    HPI:  This is a 70 y.o. male here for evaluation of : CRC screening      Medical History:  has a past medical history of Arthritis.    Surgical History:  has a past surgical history that includes arm surgury; neck surgury; Carpal tunnel release; Ulnar nerve transposition; and Tibia fracture surgery.    Family History: family history is not on file.    Social History:  reports that he quit smoking about 8 years ago. His smoking use included cigarettes. He has never used smokeless tobacco. He reports current alcohol use of about 5.0 standard drinks of alcohol per week. He reports that he does not use drugs.    Review of patient's allergies indicates:  No Known Allergies    Medications:   Prescriptions Prior to Admission[1]      Physical Exam:    Vital Signs:   Vitals:    05/23/25 0812   BP: (!) 98/54   Pulse: 71   Resp: 18   Temp: 98.4 °F (36.9 °C)       General Appearance: Well appearing in no acute distress  Head: Normocephalic, without obvious abnormality   Lungs: Non-labored breathing  Abdomen: Soft, non tender, non distended     Labs:  Lab Results   Component Value Date    WBC 6.78 02/28/2024    HGB 12.5 (L) 02/28/2024    HCT 37.9 (L) 02/28/2024     02/28/2024    CHOL 128 02/26/2024    TRIG 166 (H) 02/26/2024    HDL 41 02/26/2024    ALT 22 02/28/2024    AST 23 02/28/2024     (L) 02/28/2024    K 4.8 02/28/2024     02/28/2024    CREATININE 0.8 02/28/2024    BUN 15 02/28/2024    CO2 24 02/28/2024    TSH 1.980 02/26/2024    INR 1.0 02/26/2024    HGBA1C 5.7 (H) 02/27/2024       I have explained the risks and benefits of endoscopy procedures to the patient including but not limited to bleeding, perforation, infection, and  death.  The patient was asked if they understand and allowed to ask any further questions to their satisfaction.    Randy Colmenares MD        [1]   Medications Prior to Admission   Medication Sig Dispense Refill Last Dose/Taking    aspirin (ECOTRIN) 81 MG EC tablet Take 1 tablet (81 mg total) by mouth once daily. 90 tablet 2 5/23/2025 Morning    metoprolol tartrate (LOPRESSOR) 25 MG tablet Take 50 mg by mouth 2 (two) times daily.   5/23/2025 Morning    olmesartan (BENICAR) 40 MG tablet Take 40 mg by mouth once daily.   5/23/2025 Morning    amoxicillin (AMOXIL) 500 MG capsule Take 500 mg by mouth 3 (three) times daily.       atorvastatin (LIPITOR) 20 MG tablet Take 1 tablet (20 mg total) by mouth every evening. 90 tablet 3     azelastine (ASTELIN) 137 mcg (0.1 %) nasal spray 1 spray (137 mcg total) by Nasal route 2 (two) times daily. 30 mL 3     cyclobenzaprine (FLEXERIL) 10 MG tablet Take 10 mg by mouth 3 (three) times daily.       ergocalciferol (ERGOCALCIFEROL) 50,000 unit Cap Take 1 capsule by mouth every 7 days.       flu vac qs 2019,4 yr up,CD,PF, (FLUCELVAX QUAD 1440-5048, PF,) 60 mcg (15 mcg x 4)/0.5 mL Syrg Flucelvax Quad 3848-5158 (PF) 60 mcg (15 mcg x 4)/0.5 mL IM syringe   ADM 0.5ML IM UTD       fluticasone propionate (FLONASE) 50 mcg/actuation nasal spray 2 sprays (100 mcg total) by Each Nostril route 2 (two) times daily. 18.2 mL 3     gabapentin (NEURONTIN) 600 MG tablet Take 1 tablet by mouth 3 (three) times daily.       HYDROcodone-acetaminophen (NORCO)  mg per tablet TAKE 1 TABLET BY MOUTH FIVE TIMES DAILY AS NEEDED FOR PAIN       ibuprofen (ADVIL,MOTRIN) 800 MG tablet Take 800 mg by mouth.       ketoconazole (NIZORAL) 2 % cream Apply topically once daily. 60 g 3

## 2025-05-23 NOTE — TRANSFER OF CARE
Anesthesia Transfer of Care Note    Patient: Prasanna Scott    Procedure(s) Performed: Procedure(s) (LRB):  COLONOSCOPY, SCREENING, LOW RISK PATIENT (N/A)    Patient location: GI    Anesthesia Type: general    Transport from OR: Transported from OR on room air with adequate spontaneous ventilation    Post pain: adequate analgesia    Post assessment: no apparent anesthetic complications and tolerated procedure well    Post vital signs: stable    Level of consciousness: lethargic and responds to stimulation    Nausea/Vomiting: no nausea/vomiting    Complications: none    Transfer of care protocol was followed      Last vitals: Visit Vitals  /62   Pulse 67   Temp 36.3 °C (97.4 °F)   Resp 18   Ht 6' (1.829 m)   Wt 72.6 kg (160 lb)   SpO2 99%   BMI 21.70 kg/m²

## 2025-05-26 LAB
ESTROGEN SERPL-MCNC: NORMAL PG/ML
INSULIN SERPL-ACNC: NORMAL U[IU]/ML
LAB AP CLINICAL INFORMATION: NORMAL
LAB AP GROSS DESCRIPTION: NORMAL
LAB AP PERFORMING LOCATION(S): NORMAL
LAB AP REPORT FOOTNOTES: NORMAL

## 2025-05-28 ENCOUNTER — RESULTS FOLLOW-UP (OUTPATIENT)
Dept: GASTROENTEROLOGY | Facility: HOSPITAL | Age: 70
End: 2025-05-28

## 2025-05-28 ENCOUNTER — CLINICAL SUPPORT (OUTPATIENT)
Dept: DERMATOLOGY | Facility: CLINIC | Age: 70
End: 2025-05-28
Payer: COMMERCIAL

## 2025-05-28 VITALS
OXYGEN SATURATION: 98 % | BODY MASS INDEX: 21.67 KG/M2 | SYSTOLIC BLOOD PRESSURE: 119 MMHG | HEART RATE: 61 BPM | WEIGHT: 160 LBS | HEIGHT: 72 IN | RESPIRATION RATE: 15 BRPM | TEMPERATURE: 97 F | DIASTOLIC BLOOD PRESSURE: 67 MMHG

## 2025-05-28 DIAGNOSIS — Z48.02 VISIT FOR SUTURE REMOVAL: Primary | ICD-10-CM

## 2025-05-28 PROCEDURE — 99024 POSTOP FOLLOW-UP VISIT: CPT | Mod: S$GLB,,, | Performed by: DERMATOLOGY

## 2025-05-28 NOTE — PROGRESS NOTES
CC: 70 y.o.male patient is here for suture removal.     HPI: Patient is s/p punch biopsy/excision of L temple on 5/20/2025.  Patient reports no problems.    WOUND PE:  Sutures intact.  Wound healing well.  Good approximation of skin edges.  No signs or symptoms of infection.    IMPRESSION:  Skin, left temple, punch biopsy:   -ECCRINE SPIRADENOMA  This is a benign lesion. No further treatment is necessary.     PLAN:  Sutures removed today.  Continue wound care.    RTC: In 6 month(s) for skin check or sooner should any new concerns arise

## 2025-06-18 ENCOUNTER — PROCEDURE VISIT (OUTPATIENT)
Dept: DERMATOLOGY | Facility: CLINIC | Age: 70
End: 2025-06-18
Payer: COMMERCIAL

## 2025-06-18 VITALS — DIASTOLIC BLOOD PRESSURE: 67 MMHG | SYSTOLIC BLOOD PRESSURE: 133 MMHG | HEART RATE: 59 BPM

## 2025-06-18 DIAGNOSIS — C44.111 BASAL CELL CARCINOMA (BCC) OF MEDIAL CANTHUS OF RIGHT EYE: Primary | ICD-10-CM

## 2025-06-18 PROCEDURE — 17311 MOHS 1 STAGE H/N/HF/G: CPT | Mod: S$GLB,,, | Performed by: DERMATOLOGY

## 2025-06-18 PROCEDURE — 99499 UNLISTED E&M SERVICE: CPT | Mod: S$GLB,,, | Performed by: DERMATOLOGY

## 2025-06-18 PROCEDURE — 17312 MOHS ADDL STAGE: CPT | Mod: S$GLB,,, | Performed by: DERMATOLOGY

## 2025-06-18 PROCEDURE — 13151 CMPLX RPR E/N/E/L 1.1-2.5 CM: CPT | Mod: 51,S$GLB,, | Performed by: DERMATOLOGY

## 2025-06-18 NOTE — PROGRESS NOTES
PROCEDURE: Mohs' Micrographic Surgery    INDICATION: Location in mask areas of face including central face, nose, eyelids, eyebrows, lips, chin, preauricular, temple, and ear. Biopsy-proven skin cancer of cosmetically and functionally important areas, including head, neck, genital, hand, foot, or areas known for having difficulty in healing, such as the lower anterior legs. Tumor with ill-defined borders.    REFERRING PROVIDER: Rosita Pereira M.D.    CASE NUMBER:     ANESTHETIC: 2.5 cc 0.5% Lidocaine with Epi 1:200,000 mixed 1:1 with 0.5% Bupivacaine    SURGICAL PREP: Betadine    SURGEON: Ruba Martínez MD    ASSISTANTS: Cherise May PA-C, Sudha Bruce MA, and Kelly Castellanos, Surg Tech    PREOPERATIVE DIAGNOSIS: basal cell carcinoma- nodular    POSTOPERATIVE DIAGNOSIS: basal cell carcinoma- nodular, micronodular    PATHOLOGIC DIAGNOSIS: basal cell carcinoma- nodular, micronodular    HISTOLOGY OF SPECIMENS IN FIRST STAGE:   Debulking tumor confirms nodular and micronodular basal cell carcinoma.  Tumor Type: Tumor seen. Nodular basal cell carcinoma: Nodular tumor in dermis composed of basaloid cells exhibiting peripheral palisading and retraction artifact.  Micronodular basal cell carcinoma: Tumor in dermis composed of basaloid cells in small nodular aggregates exhibiting peripheral palisading and retraction artifact.   Depth of Invasion: epidermis and dermis  Perineural Invasion: No    HISTOLOGY OF SPECIMENS IN SUBSEQUENT STAGES:  Tumor Type: No tumor seen.    STAGES OF MOHS' SURGERY PERFORMED: 2    TUMOR-FREE PLANE ACHIEVED: Yes    HEMOSTASIS: electrocoagulation     SPECIMENS: 3 (2 in stage A and 1 in stage B)    LOCATION: right medial canthus. Location verified with Dr. Pereira's clinical photograph. Patient also verified location with hand held mirror.    INITIAL LESION SIZE: 0.4 x 0.4 cm    FINAL DEFECT SIZE: 0.6 x 0.9 cm    WOUND REPAIR/DISPOSITION: The patient tolerated Mohs' Micrographic Surgery  for a basal cell carcinoma very well. When the tumor was completely removed, a repair of the surgical defect was undertaken.        PROCEDURE: Complex Linear Repair    INDICATION: Status post Mohs' Micrographic Surgery for basal cell carcinoma.    CASE NUMBER:     SURGEON: Ruba Martínez MD    ASSISTANTS: Cherise May PA-C and Bret Vieira    ANESTHETIC: 0.5 cc 1% Lidocaine with Epinephrine 1:100,000    SURGICAL PREP: Betadine, prepped by Bret Vieira    LOCATION: right medial canthus    DEFECT SIZE: 0.6 x 0.9 cm    WOUND REPAIR/DISPOSITION:  After the patient's carcinoma had been completely removed with Mohs' Micrographic Surgery, a repair of the surgical defect was undertaken. The patient was returned to the operating suite where the area of right medial canthus was prepped, draped, and anesthetized in the usual sterile fashion. The wound was widely undermined in all directions. The wound was undermined to a distance at least the maximum width of the defect as measured perpendicular to the closure line along at least one entire edge of the defect, in this case 1 cm. Then, electrocoagulation was used to obtain meticulous hemostasis. 5-0 Vicryl buried vertical mattress sutures were placed into the deeper layers of subcutaneous and superficial (non-muscle) fascial plane to close the wound and turner the cutaneous wound edge. Bilateral dog ears were identified and were removed by a standard Burow's triangle technique. The cutaneous wound edges were closed using interrupted 6-0 Prolene suture.    The patient tolerated the procedure well.    The area was cleaned and dressed appropriately and the patient was given wound care instructions, as well as appointment for follow-up evaluation and suture removal in 7 days.    LENGTH OF REPAIR: 1.7 cm    Vitals:    06/18/25 0740 06/18/25 1048   BP: 135/70 133/67   BP Location: Right arm Left arm   Patient Position: Sitting Sitting   Pulse: 64 (!) 59

## 2025-06-25 ENCOUNTER — OFFICE VISIT (OUTPATIENT)
Dept: DERMATOLOGY | Facility: CLINIC | Age: 70
End: 2025-06-25
Payer: COMMERCIAL

## 2025-06-25 ENCOUNTER — OFFICE VISIT (OUTPATIENT)
Dept: PODIATRY | Facility: CLINIC | Age: 70
End: 2025-06-25
Payer: COMMERCIAL

## 2025-06-25 VITALS — BODY MASS INDEX: 21.68 KG/M2 | HEIGHT: 72 IN | WEIGHT: 160.06 LBS

## 2025-06-25 DIAGNOSIS — Z53.20 PROCEDURE NOT CARRIED OUT BECAUSE OF PATIENT'S DECISION: ICD-10-CM

## 2025-06-25 DIAGNOSIS — L84 CORN OR CALLUS: Primary | ICD-10-CM

## 2025-06-25 DIAGNOSIS — Z09 POSTOP CHECK: Primary | ICD-10-CM

## 2025-06-25 DIAGNOSIS — N40.1 BENIGN PROSTATIC HYPERPLASIA WITH LOWER URINARY TRACT SYMPTOMS: Primary | ICD-10-CM

## 2025-06-25 PROCEDURE — 99024 POSTOP FOLLOW-UP VISIT: CPT | Mod: S$GLB,,, | Performed by: DERMATOLOGY

## 2025-06-25 PROCEDURE — 99999 PR PBB SHADOW E&M-EST. PATIENT-LVL III: CPT | Mod: PBBFAC,,, | Performed by: DERMATOLOGY

## 2025-06-25 PROCEDURE — 99499 UNLISTED E&M SERVICE: CPT | Mod: S$GLB,,, | Performed by: PODIATRIST

## 2025-06-25 PROCEDURE — 99999 PR PBB SHADOW E&M-EST. PATIENT-LVL III: CPT | Mod: PBBFAC,,, | Performed by: PODIATRIST

## 2025-06-25 NOTE — PROGRESS NOTES
70 y.o. male patient is here for suture removal following Mohs' surgery.    Patient reports no problems.    WOUND PE:  The right medial canthus sutures intact. Wound healing well. Good skin edges. No signs or symptoms of infection.    IMPRESSION:  Healing operative site from Mohs' surgery BCC right medial canthus s/p Mohs with CLC, postop day #7.    PLAN:  Sutures removed today by Maria L Feliciano MA. Steri-strips applied.  Continue wound care.  Keep moist with Aquaphor.    RTC:  In 1 month.

## 2025-07-07 ENCOUNTER — TELEPHONE (OUTPATIENT)
Dept: UROLOGY | Facility: CLINIC | Age: 70
End: 2025-07-07
Payer: COMMERCIAL

## 2025-07-07 NOTE — TELEPHONE ENCOUNTER
Spoke with pt. Pt informed me he will try to get his PSA done tomorrow if he is able to get his car. Pt stated if he cannot he will call to r.s  ----- Message from Katie Oliveira MD sent at 7/7/2025  4:20 PM CDT -----  Regarding: psa  Please ensure patient has PSA done prior to appt with me, takes 24 hours for lab to result

## 2025-07-10 ENCOUNTER — PROCEDURE VISIT (OUTPATIENT)
Dept: DERMATOLOGY | Facility: CLINIC | Age: 70
End: 2025-07-10
Payer: COMMERCIAL

## 2025-07-10 DIAGNOSIS — C44.91 SUPERFICIAL BASAL CELL CARCINOMA: Primary | ICD-10-CM

## 2025-07-10 NOTE — PROGRESS NOTES
Pt here for E and S of bx proven nodular BCC right upper back:  Skin, right upper back, shave biopsy:  -BASAL CELL CARCINOMA (PIGMENTED), NODULAR TYPE, FOCALLY EXTENDING TO THE DEEP BIOPSY EDGE    On PE a well healed scar with no s/s of cancer persistence is noted.   Will defer additional treatment to right upper back at this time.    Here for electrodesiccation and curettage of superficial bcc on the right lower back. bx done on 5/20/25:  Skin, right lower back, shave biopsy:  -BASAL CELL CARCINOMA, SUPERFICIAL TYPE, EXTENDING TO A PERIPHERAL BIOPSY EDGE    Electrodessication and Curettage Procedure note:    Verbal consent obtained. Lesional tissue marked and prepped with alcohol. Lesion anesthetized with 1% lidocaine with epinephrine. Curettage and Desiccation x 3 cycles to base. Aluminum chloride for hemostasis. Lesion size after primary curettage: 2.0 cm    Area bandaged and wound care explained.    F/u 3 months

## 2025-07-10 NOTE — PATIENT INSTRUCTIONS

## 2025-07-14 ENCOUNTER — TELEPHONE (OUTPATIENT)
Dept: UROLOGY | Facility: CLINIC | Age: 70
End: 2025-07-14
Payer: COMMERCIAL

## 2025-07-15 ENCOUNTER — LAB VISIT (OUTPATIENT)
Dept: LAB | Facility: HOSPITAL | Age: 70
End: 2025-07-15
Attending: UROLOGY
Payer: COMMERCIAL

## 2025-07-15 DIAGNOSIS — N40.1 BPH WITH OBSTRUCTION/LOWER URINARY TRACT SYMPTOMS: ICD-10-CM

## 2025-07-15 DIAGNOSIS — N13.8 BPH WITH OBSTRUCTION/LOWER URINARY TRACT SYMPTOMS: ICD-10-CM

## 2025-07-15 LAB — PSA SERPL-MCNC: 3.2 NG/ML

## 2025-07-15 PROCEDURE — 84153 ASSAY OF PSA TOTAL: CPT

## 2025-07-15 PROCEDURE — 36415 COLL VENOUS BLD VENIPUNCTURE: CPT

## 2025-07-23 ENCOUNTER — OFFICE VISIT (OUTPATIENT)
Dept: UROLOGY | Facility: CLINIC | Age: 70
End: 2025-07-23
Payer: COMMERCIAL

## 2025-07-23 ENCOUNTER — TELEPHONE (OUTPATIENT)
Dept: UROLOGY | Facility: CLINIC | Age: 70
End: 2025-07-23
Payer: COMMERCIAL

## 2025-07-23 VITALS — WEIGHT: 154.75 LBS | BODY MASS INDEX: 20.99 KG/M2

## 2025-07-23 DIAGNOSIS — N40.1 BENIGN PROSTATIC HYPERPLASIA WITH LOWER URINARY TRACT SYMPTOMS, SYMPTOM DETAILS UNSPECIFIED: ICD-10-CM

## 2025-07-23 DIAGNOSIS — N52.9 ERECTILE DYSFUNCTION, UNSPECIFIED ERECTILE DYSFUNCTION TYPE: Primary | ICD-10-CM

## 2025-07-23 PROCEDURE — 99999 PR PBB SHADOW E&M-EST. PATIENT-LVL II: CPT | Mod: PBBFAC,,, | Performed by: STUDENT IN AN ORGANIZED HEALTH CARE EDUCATION/TRAINING PROGRAM

## 2025-07-23 PROCEDURE — 99214 OFFICE O/P EST MOD 30 MIN: CPT | Mod: S$GLB,,, | Performed by: STUDENT IN AN ORGANIZED HEALTH CARE EDUCATION/TRAINING PROGRAM

## 2025-07-23 PROCEDURE — G2211 COMPLEX E/M VISIT ADD ON: HCPCS | Mod: S$GLB,,, | Performed by: STUDENT IN AN ORGANIZED HEALTH CARE EDUCATION/TRAINING PROGRAM

## 2025-07-23 RX ORDER — TADALAFIL 20 MG/1
20 TABLET ORAL DAILY PRN
Qty: 30 TABLET | Refills: 11 | Status: SHIPPED | OUTPATIENT
Start: 2025-07-23 | End: 2026-07-23

## 2025-07-23 NOTE — PROGRESS NOTES
Patient ID: Prasanna Scott is a 70 y.o. male.    Chief Complaint: FU   Referral: Aaliyah Burton, NP  [id number] [place] [address]     [facility]  70 y.o. for FU. Previously seen by Dr. Hernandez. Uses tadalafil for ED. Patient w/ brother w/ prostate cancer ( brachytherapy) . Denies difficulty with voiding.     Medically Necessary ROS documented in HPI    Past Medical History  Active Ambulatory Problems     Diagnosis Date Noted    Finger pain 09/12/2018    Primary osteoarthritis of first carpometacarpal joint of right hand 11/21/2018    Unintentional opioid overdose 02/27/2024    Arm numbness 02/27/2024    Hypertension 02/27/2024    Acquired dilation of ascending aorta and aortic root 01/14/2025    Other hyperlipidemia 01/14/2025     Resolved Ambulatory Problems     Diagnosis Date Noted    No Resolved Ambulatory Problems     Past Medical History:   Diagnosis Date    Arthritis     BCC (basal cell carcinoma) 06/18/2025         Past Surgical History  Past Surgical History:   Procedure Laterality Date    arm surgury      CARPAL TUNNEL RELEASE      COLONOSCOPY, SCREENING, LOW RISK PATIENT N/A 5/23/2025    Procedure: COLONOSCOPY, SCREENING, LOW RISK PATIENT;  Surgeon: Randy Colmenares MD;  Location: [facility];  Service: Gastroenterology;  Laterality: N/A;  4/3 ref [facility]   [facility]    [facility]      TIBIA FRACTURE SURGERY      ULNAR NERVE TRANSPOSITION         Social History       Medications  Current Medications[1]    Allergies  Review of patient's allergies indicates:  No Known Allergies    Patient's PMH, FH, Social hx, Medications, allergies reviewed and updated as pertinent to today's visit    Objective:      Physical Exam  Constitutional:       General: He is not in acute distress.     Appearance: He is well-developed. He is not ill-appearing, toxic-appearing or diaphoretic.   HENT:      Head: Normocephalic and atraumatic.      Mouth/Throat:      Mouth: Mucous membranes are moist.    Eyes:      Conjunctiva/sclera: Conjunctivae normal.   Cardiovascular:      Rate and Rhythm: Normal rate and regular rhythm.   Pulmonary:      Effort: Pulmonary effort is normal. No respiratory distress.   Abdominal:      General: Abdomen is flat. There is no distension.      Palpations: Abdomen is soft. There is no mass.      Tenderness: There is no abdominal tenderness. There is no right CVA tenderness, left CVA tenderness or guarding.   Genitourinary:     Comments: LAYA normal, 40 g gland, smooth    Musculoskeletal:         General: No swelling or deformity.      Cervical back: Neck supple.   Skin:     General: Skin is warm.      Capillary Refill: Capillary refill takes less than 2 seconds.      Findings: No rash.   Neurological:      Mental Status: He is alert and oriented to person, place, and time.      Gait: Gait normal.   Psychiatric:         Mood and Affect: Mood normal.         Thought Content: Thought content normal.         Judgment: Judgment normal.             Lab Results   Component Value Date    PSADIAG 2.7 03/02/2023    PSADIAG 2.1 12/01/2021      Assessment:       1. Erectile dysfunction, unspecified erectile dysfunction type    2. Benign prostatic hyperplasia with lower urinary tract symptoms        Plan:       ED   Chronic condition, stable  Patient declined printed rx for tadalafil per advisory for best practices    Called the phone number he requested to handle his Rx    Non true PVR [id number]    PSA due 1 year, currently normal at 3.27- FH of brother w/ prostate cancer. (7/15/2025. )Normal LAYA      Visit today included increased complexity associated with the care of the episodic problem as above  addressed and managing the longitudinal care of the patient due to the serious and/or complex managed problem(s) as above .         [1]   Current Outpatient Medications:     amoxicillin (AMOXIL) 500 MG capsule, Take 500 mg by mouth 3 (three) times daily., Disp: , Rfl:     aspirin (ECOTRIN) 81 MG EC  tablet, Take 1 tablet (81 mg total) by mouth once daily., Disp: 90 tablet, Rfl: 2    atorvastatin (LIPITOR) 20 MG tablet, Take 1 tablet (20 mg total) by mouth every evening., Disp: 90 tablet, Rfl: 3    azelastine (ASTELIN) 137 mcg (0.1 %) nasal spray, 1 spray (137 mcg total) by Nasal route 2 (two) times daily., Disp: 30 mL, Rfl: 3    cyclobenzaprine (FLEXERIL) 10 MG tablet, Take 10 mg by mouth 3 (three) times daily., Disp: , Rfl:     ergocalciferol (ERGOCALCIFEROL) 50,000 unit Cap, Take 1 capsule by mouth every 7 days., Disp: , Rfl:     flu vac qs 2019,4 yr up,CD,PF, (FLUCELVAX QUAD 0166-7425, PF,) 60 mcg (15 mcg x 4)/0.5 mL Syrg, Flucelvax Quad 8693-3791 (PF) 60 mcg (15 mcg x 4)/0.5 mL IM syringe  ADM 0.5ML IM UTD, Disp: , Rfl:     fluticasone propionate (FLONASE) 50 mcg/actuation nasal spray, 2 sprays (100 mcg total) by Each Nostril route 2 (two) times daily., Disp: 18.2 mL, Rfl: 3    gabapentin (NEURONTIN) 600 MG tablet, Take 1 tablet by mouth 3 (three) times daily., Disp: , Rfl:     HYDROcodone-acetaminophen (NORCO)  mg per tablet, TAKE 1 TABLET BY MOUTH FIVE TIMES DAILY AS NEEDED FOR PAIN, Disp: , Rfl:     ibuprofen (ADVIL,MOTRIN) 800 MG tablet, Take 800 mg by mouth., Disp: , Rfl:     ketoconazole (NIZORAL) 2 % cream, Apply topically once daily., Disp: 60 g, Rfl: 3    metoprolol tartrate (LOPRESSOR) 25 MG tablet, Take 50 mg by mouth 2 (two) times daily., Disp: , Rfl:     olmesartan (BENICAR) 40 MG tablet, Take 40 mg by mouth once daily., Disp: , Rfl:     tadalafiL (CIALIS) 20 MG Tab, Take 1 tablet (20 mg total) by mouth daily as needed (take 1-2 hours prior to intimate event)., Disp: 30 tablet, Rfl: 11

## 2025-07-24 ENCOUNTER — OFFICE VISIT (OUTPATIENT)
Dept: DERMATOLOGY | Facility: CLINIC | Age: 70
End: 2025-07-24
Payer: COMMERCIAL

## 2025-07-24 DIAGNOSIS — C44.111 BASAL CELL CARCINOMA (BCC) OF MEDIAL CANTHUS OF RIGHT EYE: Primary | ICD-10-CM

## 2025-07-24 PROCEDURE — 99999 PR PBB SHADOW E&M-EST. PATIENT-LVL III: CPT | Mod: PBBFAC,,, | Performed by: DERMATOLOGY

## 2025-07-24 PROCEDURE — 99212 OFFICE O/P EST SF 10 MIN: CPT | Mod: S$GLB,,, | Performed by: DERMATOLOGY

## 2025-07-24 NOTE — PROGRESS NOTES
70 y.o. male patient is here for wound check after surgery.    Patient reports no problems.    WOUND PE:  The right medial canthus incision is well healed. Mild firmness and erythema of scar. No nodularity.    IMPRESSION:  Healing operative site from Mohs' surgery BCC right medial canthus s/p Mohs, postop week #5, well-healed and no evidence of recurrence.     PLAN:   Recommended massage daily x 10-15 min per day to soften  Reassured patient that redness and firmness will fade with time.  Daily SPF.  Regular skin checks.    RTC:  In 3-6 months with Rosita Pereira M.D. for skin check or sooner if new concern arises.